# Patient Record
Sex: MALE | Race: WHITE | HISPANIC OR LATINO | Employment: UNEMPLOYED | URBAN - METROPOLITAN AREA
[De-identification: names, ages, dates, MRNs, and addresses within clinical notes are randomized per-mention and may not be internally consistent; named-entity substitution may affect disease eponyms.]

---

## 2019-04-16 ENCOUNTER — TELEPHONE (OUTPATIENT)
Dept: NEUROLOGY | Facility: CLINIC | Age: 39
End: 2019-04-16

## 2019-05-23 ENCOUNTER — OFFICE VISIT (OUTPATIENT)
Dept: NEUROLOGY | Facility: CLINIC | Age: 39
End: 2019-05-23
Payer: COMMERCIAL

## 2019-05-23 ENCOUNTER — TELEPHONE (OUTPATIENT)
Dept: NEUROLOGY | Facility: CLINIC | Age: 39
End: 2019-05-23

## 2019-05-23 VITALS
BODY MASS INDEX: 28.98 KG/M2 | WEIGHT: 191.2 LBS | DIASTOLIC BLOOD PRESSURE: 82 MMHG | HEART RATE: 106 BPM | HEIGHT: 68 IN | RESPIRATION RATE: 18 BRPM | SYSTOLIC BLOOD PRESSURE: 130 MMHG

## 2019-05-23 DIAGNOSIS — F31.4 BIPOLAR 1 DISORDER, DEPRESSED, SEVERE (HCC): ICD-10-CM

## 2019-05-23 DIAGNOSIS — R41.3 AMNESIA/MEMORY DISORDER: Primary | ICD-10-CM

## 2019-05-23 PROBLEM — F29 PSYCHOSIS (HCC): Status: ACTIVE | Noted: 2019-03-08

## 2019-05-23 PROBLEM — F43.10 PTSD (POST-TRAUMATIC STRESS DISORDER): Status: ACTIVE | Noted: 2018-01-05

## 2019-05-23 PROBLEM — B20: Status: ACTIVE | Noted: 2017-01-06

## 2019-05-23 PROBLEM — G47.9 TROUBLE IN SLEEPING: Status: ACTIVE | Noted: 2019-03-09

## 2019-05-23 PROBLEM — G56.03 BILATERAL CARPAL TUNNEL SYNDROME: Status: ACTIVE | Noted: 2017-02-24

## 2019-05-23 PROBLEM — A52.8 LATE LATENT SYPHILIS: Status: ACTIVE | Noted: 2019-05-23

## 2019-05-23 PROBLEM — Z21: Status: ACTIVE | Noted: 2017-01-06

## 2019-05-23 PROBLEM — M54.50 CHRONIC BILATERAL LOW BACK PAIN WITHOUT SCIATICA: Status: ACTIVE | Noted: 2018-09-17

## 2019-05-23 PROBLEM — K29.50 CHRONIC GASTRITIS WITHOUT BLEEDING: Status: ACTIVE | Noted: 2017-02-15

## 2019-05-23 PROBLEM — E78.1 HYPERTRIGLYCERIDEMIA: Status: ACTIVE | Noted: 2017-02-15

## 2019-05-23 PROBLEM — G89.29 CHRONIC BILATERAL LOW BACK PAIN WITHOUT SCIATICA: Status: ACTIVE | Noted: 2018-09-17

## 2019-05-23 PROCEDURE — 99243 OFF/OP CNSLTJ NEW/EST LOW 30: CPT | Performed by: PSYCHIATRY & NEUROLOGY

## 2019-05-23 RX ORDER — TRIAMCINOLONE ACETONIDE 55 UG/1
SPRAY, METERED NASAL
COMMUNITY
Start: 2019-04-22

## 2019-05-23 RX ORDER — CLONAZEPAM 0.5 MG/1
TABLET ORAL
COMMUNITY
Start: 2019-05-17

## 2019-05-23 RX ORDER — FAMOTIDINE 20 MG/1
TABLET, FILM COATED ORAL
COMMUNITY
Start: 2019-04-23

## 2019-05-23 RX ORDER — ABACAVIR SULFATE, DOLUTEGRAVIR SODIUM, LAMIVUDINE 600; 50; 300 MG/1; MG/1; MG/1
TABLET, FILM COATED ORAL
COMMUNITY
Start: 2019-05-06

## 2019-05-23 RX ORDER — QUETIAPINE FUMARATE 100 MG/1
TABLET, FILM COATED ORAL
COMMUNITY
Start: 2019-05-08

## 2019-05-23 RX ORDER — ALBUTEROL SULFATE 90 UG/1
AEROSOL, METERED RESPIRATORY (INHALATION)
COMMUNITY
Start: 2019-04-08 | End: 2019-05-23 | Stop reason: SDUPTHER

## 2019-05-23 RX ORDER — MONTELUKAST SODIUM 10 MG/1
10 TABLET ORAL
COMMUNITY
Start: 2019-04-08 | End: 2020-04-07

## 2019-05-23 RX ORDER — TRAMADOL HYDROCHLORIDE 50 MG/1
TABLET ORAL
COMMUNITY
Start: 2019-03-08

## 2019-05-23 RX ORDER — CLINDAMYCIN PHOSPHATE 10 UG/ML
LOTION TOPICAL
COMMUNITY
Start: 2019-05-07

## 2019-05-23 RX ORDER — ADAPALENE 45 G/G
GEL TOPICAL
COMMUNITY
Start: 2019-05-08

## 2019-05-23 RX ORDER — ACETAMINOPHEN 160 MG
TABLET,DISINTEGRATING ORAL
COMMUNITY
Start: 2019-05-22

## 2019-05-23 RX ORDER — FLUTICASONE FUROATE AND VILANTEROL 100; 25 UG/1; UG/1
1 POWDER RESPIRATORY (INHALATION) DAILY
COMMUNITY
Start: 2018-10-04 | End: 2019-10-04

## 2019-05-23 RX ORDER — LURASIDONE HYDROCHLORIDE 80 MG/1
TABLET, FILM COATED ORAL
COMMUNITY
Start: 2019-03-22

## 2019-05-23 RX ORDER — ALBUTEROL SULFATE 90 UG/1
AEROSOL, METERED RESPIRATORY (INHALATION)
COMMUNITY
Start: 2019-05-06

## 2019-05-23 RX ORDER — ACETAMINOPHEN 160 MG
TABLET,DISINTEGRATING ORAL
COMMUNITY
Start: 2019-04-23 | End: 2019-05-23 | Stop reason: SDUPTHER

## 2019-05-23 RX ORDER — CELECOXIB 50 MG/1
CAPSULE ORAL
COMMUNITY
Start: 2019-04-23

## 2019-05-23 RX ORDER — CETIRIZINE HYDROCHLORIDE 10 MG/1
10 TABLET, CHEWABLE ORAL DAILY
COMMUNITY
Start: 2019-04-08

## 2019-05-23 RX ORDER — GUAIFENESIN, PSEUDOEPHEDRINE HYDROCHLORIDE 600; 60 MG/1; MG/1
1 TABLET, EXTENDED RELEASE ORAL EVERY 12 HOURS
COMMUNITY
Start: 2019-04-11 | End: 2020-04-10

## 2019-05-23 RX ORDER — CLONAZEPAM 1 MG/1
TABLET ORAL
COMMUNITY
Start: 2019-03-19

## 2019-05-23 RX ORDER — QUETIAPINE FUMARATE 300 MG/1
300 TABLET, FILM COATED ORAL
COMMUNITY
Start: 2019-03-14

## 2019-05-23 RX ORDER — DEXTRAN 70 AND HYPROMELLOSE 2910 1; 3 MG/ML; MG/ML
SOLUTION/ DROPS OPHTHALMIC
COMMUNITY
Start: 2019-05-22

## 2019-05-23 RX ORDER — MULTIVITAMIN,THERAPEUTIC
TABLET ORAL
COMMUNITY
Start: 2019-05-06

## 2019-05-23 RX ORDER — PANTOPRAZOLE SODIUM 40 MG/1
TABLET, DELAYED RELEASE ORAL
COMMUNITY
Start: 2019-04-23

## 2019-05-23 RX ORDER — GABAPENTIN 800 MG/1
800 TABLET ORAL 3 TIMES DAILY
COMMUNITY
Start: 2019-05-22

## 2019-05-23 RX ORDER — CHLORHEXIDINE GLUCONATE 4 G/100ML
SOLUTION TOPICAL
COMMUNITY
Start: 2019-04-23

## 2019-05-23 RX ORDER — SUCRALFATE 1 G/1
TABLET ORAL
COMMUNITY
Start: 2019-05-22

## 2019-05-23 RX ORDER — TRIAMCINOLONE ACETONIDE 55 UG/1
SPRAY, METERED NASAL
COMMUNITY
Start: 2019-05-22

## 2019-05-23 RX ORDER — QUETIAPINE FUMARATE 400 MG/1
TABLET, FILM COATED ORAL
COMMUNITY
Start: 2019-05-08

## 2019-05-23 RX ORDER — BUPROPION HYDROCHLORIDE 150 MG/1
150 TABLET ORAL DAILY
COMMUNITY
Start: 2019-03-15

## 2019-05-24 ENCOUNTER — TRANSCRIBE ORDERS (OUTPATIENT)
Dept: NEUROLOGY | Facility: CLINIC | Age: 39
End: 2019-05-24

## 2019-05-24 DIAGNOSIS — R41.3 AMNESIA/MEMORY DISORDER: Primary | ICD-10-CM

## 2019-06-05 ENCOUNTER — TELEPHONE (OUTPATIENT)
Dept: NEUROLOGY | Facility: CLINIC | Age: 39
End: 2019-06-05

## 2019-06-13 ENCOUNTER — HOSPITAL ENCOUNTER (OUTPATIENT)
Dept: NEUROLOGY | Facility: HOSPITAL | Age: 39
Discharge: HOME | End: 2019-06-13
Payer: COMMERCIAL

## 2019-06-13 DIAGNOSIS — R41.3 AMNESIA/MEMORY DISORDER: ICD-10-CM

## 2019-06-13 PROCEDURE — 95816 EEG AWAKE AND DROWSY: CPT

## 2019-06-14 PROCEDURE — 95819 EEG AWAKE AND ASLEEP: CPT | Performed by: PSYCHIATRY & NEUROLOGY

## 2019-06-18 ENCOUNTER — TELEPHONE (OUTPATIENT)
Dept: NEUROLOGY | Facility: CLINIC | Age: 39
End: 2019-06-18

## 2019-06-19 ENCOUNTER — TELEPHONE (OUTPATIENT)
Dept: NEUROLOGY | Facility: CLINIC | Age: 39
End: 2019-06-19

## 2019-06-24 ENCOUNTER — TELEPHONE (OUTPATIENT)
Dept: NEUROLOGY | Facility: CLINIC | Age: 39
End: 2019-06-24

## 2019-06-27 ENCOUNTER — TELEPHONE (OUTPATIENT)
Dept: NEUROLOGY | Facility: CLINIC | Age: 39
End: 2019-06-27

## 2019-07-08 ENCOUNTER — OFFICE VISIT (OUTPATIENT)
Dept: NEUROLOGY | Facility: CLINIC | Age: 39
End: 2019-07-08
Payer: COMMERCIAL

## 2019-07-08 ENCOUNTER — PATIENT OUTREACH (OUTPATIENT)
Dept: NEUROLOGY | Facility: CLINIC | Age: 39
End: 2019-07-08

## 2019-07-08 VITALS
RESPIRATION RATE: 18 BRPM | SYSTOLIC BLOOD PRESSURE: 130 MMHG | HEART RATE: 74 BPM | DIASTOLIC BLOOD PRESSURE: 78 MMHG | WEIGHT: 187 LBS | BODY MASS INDEX: 28.34 KG/M2 | HEIGHT: 68 IN

## 2019-07-08 DIAGNOSIS — R41.3 AMNESIA/MEMORY DISORDER: Primary | ICD-10-CM

## 2019-07-08 DIAGNOSIS — F31.4 BIPOLAR 1 DISORDER, DEPRESSED, SEVERE (HCC): ICD-10-CM

## 2019-07-08 PROCEDURE — 99213 OFFICE O/P EST LOW 20 MIN: CPT | Performed by: PSYCHIATRY & NEUROLOGY

## 2019-07-08 NOTE — PROGRESS NOTES
Informed patient of appt with Psychiatrist on 7/31/19 patient wants a  Sooner appt   Aurelio recommended patient to discuss with therapist his ext [phone] is  on 7/10/19  SW was told that patient could talk to counselor as he has access to check the cancellation list

## 2019-07-08 NOTE — PROGRESS NOTES
MSW called MARISSA patient has appt with Psychiatrist on 7/31/19 at 10:45am     Patient has appt with therapist on 7/10/19 and 7/17/19

## 2019-07-08 NOTE — ASSESSMENT & PLAN NOTE
Again, feel his issues with memory are more on a retrieval than a memory formation basis  Although he does present a family history of dementing illness, my sense at this point in time, at least with him, is that the his issues with memory are a reflection of his ongoing severe depression  Unfortunately, despite increases in his medication by Psychiatry, he does not feel that his depression has in any way improved  --will continue to work with Psychiatry in hopes of improving his depressive state and with it his symptomatic memory complaints  --will complete his blood work survey with Óscar singleton which he did not have done following his last appointment  --has asked that he be followed by the same individual who is following his mother apparently for memory issues and, as result, will be scheduled for continued care with Dr Luz Maria Graham with his appointment set October 14th

## 2019-07-08 NOTE — PROGRESS NOTES
Patient reports therapist Cam Masterson can be reached at ext  [phone] does not have follow up appointment with psych yet   SW to follow up

## 2019-07-08 NOTE — PROGRESS NOTES
As per Dr Pee Seymour patient is followed at [facility] however his psych there is out on medical leave so he is having a hard time getting another follow up appointment scheduled there  Dr Marie Head is asking if SW could help patient with rescheduling psych appt at [facility] [place]

## 2019-07-08 NOTE — LETTER
July 8, 2019     Austin Johnson, [address] [place] [id number]    Patient: Jose Alejandro Rivero   YOB: 1980   Date of Visit: 7/8/2019       Dear Dr Dona Hall: Thank you for referring Jose Alejandro Rivero to me for evaluation  Below are my notes for this consultation  If you have questions, please do not hesitate to call me  I look forward to following your patient along with you  Sincerely,        Josie Hunter MD        CC: No Recipients  Josie Hunter MD  7/8/2019  1:27 PM  Sign at close encounter  Patient ID: Jose Alejandro Rivero is a 44 y o  male  Assessment/Plan:    Disturbance of memory       Bipolar 1 disorder, depressed, severe ([address] )  Again, feel his issues with memory are more on a retrieval than a memory formation basis  Although he does present a family history of dementing illness, my sense at this point in time, at least with him, is that the his issues with memory are a reflection of his ongoing severe depression  Unfortunately, despite increases in his medication by Psychiatry, he does not feel that his depression has in any way improved  --will continue to work with Psychiatry in hopes of improving his depressive state and with it his symptomatic memory complaints  --will complete his blood work survey with Óscar singleton which he did not have done following his last appointment  --has asked that he be followed by the same individual who is following his mother apparently for memory issues and, as result, will be scheduled for continued care with Dr Alvin Valentin with his appointment set October 14th  He will follow up as noted above  Subjective:    HPI  The patient, 44years of age, returns to reassess the status of his memory complaints in the presence of what appears to be a significant element of ongoing depression  As result of language issues, phone  #[phone] was utilized      He does not feel that his memory has in any way improved since last seen and continues to describe more what would appear to be a problem with retrieval than with memory formation  His issues are complicated by his historical HIV, his diagnosed bipolar 1 and with the family history of apparent dementia  However, after his initial evaluation by Neurology, his issues were felt very likely to have a significant contribution due to his ongoing profound depression  Previously with unremarkable B12 and folate levels, normal TSH and a nonreactive RPR  Was to have a Lyme serology performed but, unfortunately, did not do so  Did have his EEG study performed that was a normal awake, drowsing and sleep study  The MRI brain that was suggested was declined by his insurance  Past Medical History:   Diagnosis Date    Bilateral carpal tunnel syndrome 2/24/2017    Chronic gastritis without bleeding 2/15/2017    Neg CT Scan A+P and EGD in 3 2017 Neg colonoscopy 6 2017 Neg Gastric Emptying study in 4 2017  Last Assessment & Plan:  Better    HIV [facility]human immunodeficiency virus infection) ([facility] )     Human immunodeficiency virus I infection ([place] [address] ) 1/6/2017    HIV (+) 2012, CD4 was 28 then, MSM transmission in [place] CD4 288 (11/2016)  HLA  neg 1 2017  cART 1- Started in 5 2012 w/ Atripla, 2- Changed to Stribild in 5 2016 due to elevated TG 3- Change to Verónica Oviedo in Summer 2017 to decrease TDF long term AE 4- Change to Triumeq in 4 2018 to decrease TG (still high) and avoid Carlita for long term AE  Last Assessment & Plan:  He was well controlled on his med    Late latent syphilis 5/23/2019    Psychosis ([facility] ) 3/8/2019    PTSD (post-traumatic stress disorder) 1/5/2018    Severe recurrent major depressive disorder with psychotic symptoms ([facility] ) 11/2/2016    Last Assessment & Plan:  Restarting medications listed on his discharge paperwork from [address] [place] [address] Road 150 mg daily, Risperdal 0 5 mg BID and Neurontin 300 mg TID    Advised patient that I will be stopping Klonopin and Ambien given his ongoing marijuana use  Reducing Ambien to 5 mg QHS with plan to discontinue at next appointment and tapering off Klonopin over the next month  He pick    Trouble in sleeping 3/9/2019     Past Surgical History:   Procedure Laterality Date    GROIN EXPLORATION      MANDIBLE SURGERY      NOSE SURGERY       Social History     Socioeconomic History    Marital status: Unknown     Spouse name: None    Number of children: None    Years of education: None    Highest education level: None   Occupational History    None   Social Needs    Financial resource strain: None    Food insecurity:     Worry: None     Inability: None    Transportation needs:     Medical: None     Non-medical: None   Tobacco Use    Smoking status: Never Smoker    Smokeless tobacco: Never Used   Substance and Sexual Activity    Alcohol use: Never     Frequency: Never    Drug use: None    Sexual activity: None   Lifestyle    Physical activity:     Days per week: None     Minutes per session: None    Stress: None   Relationships    Social connections:     Talks on phone: None     Gets together: None     Attends [employer] service: None     Active member of club or organization: None     Attends meetings of clubs or organizations: None     Relationship status: None    Intimate partner violence:     Fear of current or ex partner: None     Emotionally abused: None     Physically abused: None     Forced sexual activity: None   Other Topics Concern    None   Social History Narrative    None     Family History   Problem Relation Age of Onset    Depression Mother     Anxiety disorder Mother     Schizoaffective Disorder  Mother     Depression Father     Colon cancer Father     Depression Maternal Grandmother     Dementia Maternal Grandmother      Allergies   Allergen Reactions    Shellfish Allergy Anaphylaxis     Patient states allergic to shark meat       Shark Cartilage    [employer] Current Outpatient Medications:     abacavir-dolutegravir-lamiVUDine (TRIUMEQ) 600- mg per tablet, Take 1 tablet by mouth daily, Disp: , Rfl:     adapalene (DIFFERIN) 0 1 % gel, , Disp: , Rfl:     albuterol (PROVENTIL HFA,VENTOLIN HFA) 90 mcg/act inhaler, , Disp: , Rfl:     benzoyl peroxide 5 % external liquid, Apply topically 2 (two) times a day, Disp: , Rfl:     buPROPion (WELLBUTRIN XL) 150 mg 24 hr tablet, Take 150 mg by mouth daily, Disp: , Rfl:     celecoxib (CeleBREX) 50 MG capsule, TAKE 1 TO 2 CAPSULES BY MOUTH EVERY 12 HOURS WITH FOOD, Disp: , Rfl:     cetirizine (ZyrTEC) 10 MG chewable tablet, Chew 10 mg daily, Disp: , Rfl:     chlorhexidine (HIBICLENS) 4 % external liquid, CIPRIANO DIARIAMENTE CUANDO SEA NECESARIO PARA HERIDA ALEJANDRO FUE DIRIJIDO, Disp: , Rfl:     Cholecalciferol (VITAMIN D3) 2000 units capsule, , Disp: , Rfl:     clindamycin (CLEOCIN T) 1 % lotion, , Disp: , Rfl:     clonazePAM (KlonoPIN) 1 mg tablet, 1 to 2 tablets as needed, Disp: , Rfl:     dextran 70-hypromellose (GENTEAL TEARS) 0 1-0 3 % ophthalmic solution, PONER 2 GOTAS EN EL FELIX(S) 2 VECES AL LINDA, Disp: , Rfl:     elvitegravir-cobicistat-emtricitabine-tenofovir (STRIBILD) 497-019-349-300 mg, Take 1 tablet by mouth Daily, Disp: , Rfl:     famotidine (PEPCID) 20 mg tablet, TAKE ONE TABLET BY MOUTH 2 TIMES A DAY AS NEEDED FOR HEARTBURN, Disp: , Rfl:     fluticasone-vilanterol (BREO ELLIPTA) 100-25 mcg/inh inhaler, Inhale 1 puff daily, Disp: , Rfl:     gabapentin (NEURONTIN) 800 mg tablet, Take 800 mg by mouth 3 (three) times a day , Disp: , Rfl:     GENTEAL TEARS 0 1-0 3 % ophthalmic solution, , Disp: , Rfl:     montelukast (SINGULAIR) 10 mg tablet, Take 10 mg by mouth, Disp: , Rfl:     Multiple Vitamin (THERA-TABS) TABS, , Disp: , Rfl:     NASAL DECONGESTANT SPRAY 0 05 % nasal spray, , Disp: , Rfl:     pantoprazole (PROTONIX) 40 mg tablet, TAKE 1 TABLET BY MOUTH EVERY 12 HOURS, Disp: , Rfl:    QUEtiapine (SEROquel) 100 mg tablet, , Disp: , Rfl:     QUEtiapine (SEROquel) 400 MG tablet, , Disp: , Rfl:     sucralfate (CARAFATE) 1 g tablet, TAKE 1 TABLET BY MOUTH FOUR TIMES A DAY (BEFORE MEALS AND AT BEDTIME), Disp: , Rfl:     traMADol (ULTRAM) 50 mg tablet, Take one to two as needed for moderate pain  Do not exceed 2 tabs a day, Disp: , Rfl:     Triamcinolone Acetonide 55 MCG/ACT AERO, ECHARSE 1 CAROLINA EN CADA FOSA NASAL DIARIAMENTE, Disp: , Rfl:     Triamcinolone Acetonide 55 MCG/ACT AERO, , Disp: , Rfl:     TRIUMEQ 600- MG per tablet, , Disp: , Rfl:     clonazePAM (KlonoPIN) 0 5 mg tablet, , Disp: , Rfl:     LATUDA 80 MG tablet, , Disp: , Rfl:     pseudoephedrine-guaifenesin (MUCINEX D)  MG per tablet, Take 1 tablet by mouth every 12 (twelve) hours, Disp: , Rfl:     QUEtiapine (SEROquel) 300 mg tablet, Take 300 mg by mouth, Disp: , Rfl:     Objective:    Blood pressure 130/78, pulse 74, resp  rate 18, height 5' 8" (1 727 m), weight 84 8 kg (187 lb)  Physical Exam  Head normocephalic  Eyes nonicteric  No audible anterior neck bruits  Lungs clear to auscultation  Rhythm regular  GI (abdomen) soft nontender with bowel sounds present  No significant lower extremity edema  Neurological Exam  Alert  Pleasantly interactive  Answered correctly when asked name and birth date  Correctly state the year, the month and the date date  Two of 3 simple object recall  No difficulties with naming, repetition or accurately following a 3 step request   Bedside neuro cognitive testing not administered today as was just done on his last appointment  In review:  --27/30 on MMSE   --30/30 on a depression scale which supported in ongoing profound depression  Cranial nerves 2-12 tested and grossly intact except for a modest right facial asymmetry, unchanged  Accurate finger-to-nose bilaterally  Full symmetrical strength throughout the 4 extremities with no upper extremity drift    Diffusely and symmetrically hyporeflexic  ROS:    Review of Systems   Constitutional: Positive for appetite change and fatigue  Negative for fever  HENT: Negative  Negative for hearing loss, tinnitus, trouble swallowing and voice change  Eyes: Negative  Negative for photophobia and pain  Respiratory: Negative  Negative for shortness of breath  Cardiovascular: Negative  Negative for palpitations  Gastrointestinal: Negative  Negative for nausea and vomiting  Endocrine: Negative  Negative for cold intolerance and heat intolerance  Genitourinary: Negative  Negative for dysuria, frequency and urgency  Musculoskeletal: Negative  Negative for myalgias and neck pain  Skin: Negative  Negative for rash  Allergic/Immunologic: Negative  Neurological: Negative  Negative for dizziness, tremors, seizures, syncope, facial asymmetry, speech difficulty, weakness, light-headedness, numbness and headaches  Hematological: Negative  Does not bruise/bleed easily  Psychiatric/Behavioral: Positive for confusion  Negative for hallucinations and sleep disturbance  The patient is nervous/anxious  I personally reviewed the ROS that was entered by the medical assistant  *Please note this document was created using voice recognition software and may contain sound-alike word errors  *

## 2019-07-08 NOTE — PROGRESS NOTES
Patient ID: Rakel Garcia is a 44 y o  male  Assessment/Plan:    Disturbance of memory       Bipolar 1 disorder, depressed, severe ([address] )  Again, feel his issues with memory are more on a retrieval than a memory formation basis  Although he does present a family history of dementing illness, my sense at this point in time, at least with him, is that the his issues with memory are a reflection of his ongoing severe depression  Unfortunately, despite increases in his medication by Psychiatry, he does not feel that his depression has in any way improved  --will continue to work with Psychiatry in hopes of improving his depressive state and with it his symptomatic memory complaints  --will complete his blood work survey with Lyme serolo which he did not have done following his last appointment  --has asked that he be followed by the same individual who is following his mother apparently for memory issues and, as result, will be scheduled for continued care with Dr Mulugeta Junior with his appointment set October 14th  He will follow up as noted above  Subjective:    HPI  The patient, 44years of age, returns to reassess the status of his memory complaints in the presence of what appears to be a significant element of ongoing depression  As result of language issues, phone  #[phone] was utilized  He does not feel that his memory has in any way improved since last seen and continues to describe more what would appear to be a problem with retrieval than with memory formation  His issues are complicated by his historical HIV, his diagnosed bipolar 1 and with the family history of apparent dementia  However, after his initial evaluation by Neurology, his issues were felt very likely to have a significant contribution due to his ongoing profound depression  Previously with unremarkable B12 and folate levels, normal TSH and a nonreactive RPR    Was to have a Lyme serology performed but, unfortunately, did not do so  Did have his EEG study performed that was a normal awake, drowsing and sleep study  The MRI brain that was suggested was declined by his insurance  Past Medical History:   Diagnosis Date    Bilateral carpal tunnel syndrome 2/24/2017    Chronic gastritis without bleeding 2/15/2017    Neg CT Scan A+P and EGD in 3 2017 Neg colonoscopy 6 2017 Neg Gastric Emptying study in 4 2017  Last Assessment & Plan:  Better    HIV [facility] immunodeficiency virus infection) ([email or web address] )     Human immunodeficiency virus I infection ([place] [address] ) 1/6/2017    HIV (+) 2012, CD4 was 28 then, MSM transmission in [place] CD4 288 (11/2016)  HLA  neg 1 2017  cART 1- Started in 5 2012 w/ Atripla, 2- Changed to Stribild in 5 2016 due to elevated TG 3- Change to Josephus Caulk in Summer 2017 to decrease TDF long term AE 4- Change to Triumeq in 4 2018 to decrease TG (still high) and avoid Carlita for long term AE  Last Assessment & Plan:  He was well controlled on his med    Late latent syphilis 5/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    Psychosis ([email or web address] ) 3/8/2019    PTSD (post-traumatic stress disorder) 1/5/2018    Severe recurrent major depressive disorder with psychotic symptoms ([email or web address] ) 11/2/2016    Last Assessment & Plan:  Restarting medications listed on his discharge paperwork from [address] [place] [address] 150 mg daily, Risperdal 0 5 mg BID and Neurontin 300 mg TID  Advised patient that I will be stopping Klonopin and Ambien given his ongoing marijuana use  Reducing Ambien to 5 mg QHS with plan to discontinue at next appointment and tapering off Klonopin over the next month    He pick    Trouble in sleeping 3/9/2019     Past Surgical History:   Procedure Laterality Date    GROIN EXPLORATION      MANDIBLE SURGERY      NOSE SURGERY       Social History     Socioeconomic History    Marital status: Unknown     Spouse name: None    Number of children: None    Years of education: None    Highest education level: None   Occupational History    None Social Needs    Financial resource strain: None    Food insecurity:     Worry: None     Inability: None    Transportation needs:     Medical: None     Non-medical: None   Tobacco Use    Smoking status: Never Smoker    Smokeless tobacco: Never Used   Substance and Sexual Activity    Alcohol use: Never     Frequency: Never    Drug use: None    Sexual activity: None   Lifestyle    Physical activity:     Days per week: None     Minutes per session: None    Stress: None   Relationships    Social connections:     Talks on phone: None     Gets together: None     Attends [employer] service: None     Active member of club or organization: None     Attends meetings of clubs or organizations: None     Relationship status: None    Intimate partner violence:     Fear of current or ex partner: None     Emotionally abused: None     Physically abused: None     Forced sexual activity: None   Other Topics Concern    None   Social History Narrative    None     Family History   Problem Relation Age of Onset    Depression Mother     Anxiety disorder Mother     Schizoaffective Disorder  Mother     Depression Father     Colon cancer Father     Depression Maternal Grandmother     Dementia Maternal Grandmother      Allergies   Allergen Reactions    Shellfish Allergy Anaphylaxis     Patient states allergic to shark meat       Shark Cartilage     Shellfish-Derived Products        Current Outpatient Medications:     abacavir-dolutegravir-lamiVUDine (TRIUMEQ) 600- mg per tablet, Take 1 tablet by mouth daily, Disp: , Rfl:     adapalene (DIFFERIN) 0 1 % gel, , Disp: , Rfl:     albuterol (PROVENTIL HFA,VENTOLIN HFA) 90 mcg/act inhaler, , Disp: , Rfl:     benzoyl peroxide 5 % external liquid, Apply topically 2 (two) times a day, Disp: , Rfl:     buPROPion (WELLBUTRIN XL) 150 mg 24 hr tablet, Take 150 mg by mouth daily, Disp: , Rfl:     celecoxib (CeleBREX) 50 MG capsule, TAKE 1 TO 2 CAPSULES BY MOUTH EVERY 12 HOURS WITH FOOD, Disp: , Rfl:     cetirizine (ZyrTEC) 10 MG chewable tablet, Chew 10 mg daily, Disp: , Rfl:     chlorhexidine (HIBICLENS) 4 % external liquid, CIPRIANO DIARIAMENTE CUANDO SEA NECESARIO PARA HERIDA ALEJANDRO FUE DIRIJIDO, Disp: , Rfl:     Cholecalciferol (VITAMIN D3) 2000 units capsule, , Disp: , Rfl:     clindamycin (CLEOCIN T) 1 % lotion, , Disp: , Rfl:     clonazePAM (KlonoPIN) 1 mg tablet, 1 to 2 tablets as needed, Disp: , Rfl:     dextran 70-hypromellose (GENTEAL TEARS) 0 1-0 3 % ophthalmic solution, PONER 2 GOTAS EN EL FELIX(S) 2 VECES AL LINDA, Disp: , Rfl:     elvitegravir-cobicistat-emtricitabine-tenofovir (STRIBILD) [id number] mg, Take 1 tablet by mouth Daily, Disp: , Rfl:     famotidine (PEPCID) 20 mg tablet, TAKE ONE TABLET BY MOUTH 2 TIMES A DAY AS NEEDED FOR HEARTBURN, Disp: , Rfl:     fluticasone-vilanterol (BREO ELLIPTA) 100-25 mcg/inh inhaler, Inhale 1 puff daily, Disp: , Rfl:     gabapentin (NEURONTIN) 800 mg tablet, Take 800 mg by mouth 3 (three) times a day , Disp: , Rfl:     GENTEAL TEARS 0 1-0 3 % ophthalmic solution, , Disp: , Rfl:     montelukast (SINGULAIR) 10 mg tablet, Take 10 mg by mouth, Disp: , Rfl:     Multiple Vitamin (THERA-TABS) TABS, , Disp: , Rfl:     NASAL DECONGESTANT SPRAY 0 05 % nasal spray, , Disp: , Rfl:     pantoprazole (PROTONIX) 40 mg tablet, TAKE 1 TABLET BY MOUTH EVERY 12 HOURS, Disp: , Rfl:     QUEtiapine (SEROquel) 100 mg tablet, , Disp: , Rfl:     QUEtiapine (SEROquel) 400 MG tablet, , Disp: , Rfl:     sucralfate (CARAFATE) 1 g tablet, TAKE 1 TABLET BY MOUTH FOUR TIMES A DAY (BEFORE MEALS AND AT BEDTIME), Disp: , Rfl:     traMADol (ULTRAM) 50 mg tablet, Take one to two as needed for moderate pain   Do not exceed 2 tabs a day, Disp: , Rfl:     Triamcinolone Acetonide 55 MCG/ACT AERO, ECHARSE 1 CAROLINA EN CADA FOSA NASAL DIARIAMENTE, Disp: , Rfl:     Triamcinolone Acetonide 55 MCG/ACT AERO, , Disp: , Rfl:     TRIUMEQ 600- MG per tablet, , Disp: , Rfl:     clonazePAM (KlonoPIN) 0 5 mg tablet, , Disp: , Rfl:     LATUDA 80 MG tablet, , Disp: , Rfl:     pseudoephedrine-guaifenesin (MUCINEX D)  MG per tablet, Take 1 tablet by mouth every 12 (twelve) hours, Disp: , Rfl:     QUEtiapine (SEROquel) 300 mg tablet, Take 300 mg by mouth, Disp: , Rfl:     Objective:    Blood pressure 130/78, pulse 74, resp  rate 18, height 5' 8" (1 727 m), weight 84 8 kg (187 lb)  Physical Exam  Head normocephalic  Eyes nonicteric  No audible anterior neck bruits  Lungs clear to auscultation  Rhythm regular  GI (abdomen) soft nontender with bowel sounds present  No significant lower extremity edema  Neurological Exam  Alert  Pleasantly interactive  Answered correctly when asked name and birth date  Correctly state the year, the month and the date date  Two of 3 simple object recall  No difficulties with naming, repetition or accurately following a 3 step request   Bedside neuro cognitive testing not administered today as was just done on his last appointment  In review:  --27/30 on MMSE   --30/30 on a depression scale which supported in ongoing profound depression  Cranial nerves 2-12 tested and grossly intact except for a modest right facial asymmetry, unchanged  Accurate finger-to-nose bilaterally  Full symmetrical strength throughout the 4 extremities with no upper extremity drift  Diffusely and symmetrically hyporeflexic  ROS:    Review of Systems   Constitutional: Positive for appetite change and fatigue  Negative for fever  HENT: Negative  Negative for hearing loss, tinnitus, trouble swallowing and voice change  Eyes: Negative  Negative for photophobia and pain  Respiratory: Negative  Negative for shortness of breath  Cardiovascular: Negative  Negative for palpitations  Gastrointestinal: Negative  Negative for nausea and vomiting  Endocrine: Negative  Negative for cold intolerance and heat intolerance  Genitourinary: Negative  Negative for dysuria, frequency and urgency  Musculoskeletal: Negative  Negative for myalgias and neck pain  Skin: Negative  Negative for rash  Allergic/Immunologic: Negative  Neurological: Negative  Negative for dizziness, tremors, seizures, syncope, facial asymmetry, speech difficulty, weakness, light-headedness, numbness and headaches  Hematological: Negative  Does not bruise/bleed easily  Psychiatric/Behavioral: Positive for confusion  Negative for hallucinations and sleep disturbance  The patient is nervous/anxious  I personally reviewed the ROS that was entered by the medical assistant  *Please note this document was created using voice recognition software and may contain sound-alike word errors  *

## 2019-07-29 ENCOUNTER — TELEPHONE (OUTPATIENT)
Dept: NEUROLOGY | Facility: CLINIC | Age: 39
End: 2019-07-29

## 2019-07-29 NOTE — TELEPHONE ENCOUNTER
Dr Briant Riedel,    Patient is requesting a letter from Neurology that includes the dx given to him in neurology and a statement explaining that his dx affects his daily living activities  He is trying to obtain SSI benefits  The pt wants to provide the letter to his  as he believes the letter will help with his SSI case  Do you think that based on his dx the patient is unable to work or is limited to complete his daily living activities?      Please advise

## 2019-07-29 NOTE — TELEPHONE ENCOUNTER
This patient is no longer going to follow here  He wishes to follow with the same neurologist who takes care of his mother and that is Dr Wendy Davis  My feeling on single evaluation was that his memory difficulties were routed in severe depression and that he should continue his work with Psychiatry  If he needs a letter that his depression is impacting on his life any need to get a from a psychiatrist   He can always have a copy of our office letter as well  And if he needs anything additional from Neurology he will have to wait until after he is seen in further evaluated by Dr Wendy Davis

## 2019-07-29 NOTE — TELEPHONE ENCOUNTER
Patient contacted  to request his medical records as he is trying to appeal to obtain SSI  Informed patient that records are to be released by the [facility] office, information provided  Patient also requested the contact information for the [facility] neuro office [phone] [address]  Patient will provide information to the  that is working in his case            [facility] Medical Records  [address], [address] [place] [address] (Monday through Friday between 8 am and 6 pm)

## 2019-07-29 NOTE — LETTER
July 29, 2019     No Recipients    Patient: Karen Haque   YOB: 1980   Date of Visit: 7/29/2019                                       Sincerely,

## 2019-07-29 NOTE — LETTER
2019       To Whom it May Concern,      Olivia Townsend leelee 1980 was evaluated in our office for disturbance of memory  After evaluating the patient I concluded that his issues with memory are very likely a reflection of his ongoing severe depression  Chester Keith will continue to work with Psychiatry in hopes of improving his depressive state and with it his symptomatic memory complaints  Chester Keith will further be evaluated by Dr Wendy Davis in our Neurology office            Sincerely,          Dr Zachariah Antonio

## 2019-07-30 NOTE — TELEPHONE ENCOUNTER
Dr Sorin Rahman,    Thank you for signing the letter for the patient  While I was sharing with him what the letter states  Apparently, he wants two additional diagnosis on the letter  He shared that you gave him the dx of  'memory disorder' and 'amnesia'  I explained to the patient that I do not see those two dx on his medical records and for that reason we cannot add those dx on the letter  Please correct me if I am wrong  He reports that 'memory disorder' and 'amnesia' are in the paperwork that was given to him by the neuro office

## 2019-07-30 NOTE — TELEPHONE ENCOUNTER
SW informed patient Discussion with Dr Gray Ovalle  Patient reported understanding   Requested letter to be sent to his /therapist at [facility] faxed# [phone]    Also to be sent via mail     Letter was sent via fax as patient provided verbal permission and via mail to patient's home address

## 2019-07-30 NOTE — TELEPHONE ENCOUNTER
The amnesia portion is just what [facility] uses for the diagnosis  He has a memory disturbance as outlined in the letter given to him

## 2019-07-30 NOTE — TELEPHONE ENCOUNTER
Patient called this morning asking for a letter stating his dx for his  at [address] [place] [address] to be faxed at [phone]     Kasandra Hamilton [phone]

## 2019-08-02 NOTE — TELEPHONE ENCOUNTER
MSW called patient who asked for the letter that Dr Fernanda Potter signed to be faxed to his  who is working on his SSI case at [id number] name is Tristen aHmilton       Verbal permission given from patient

## 2019-09-30 ENCOUNTER — TELEPHONE (OUTPATIENT)
Dept: NEUROLOGY | Facility: CLINIC | Age: 39
End: 2019-09-30

## 2019-09-30 NOTE — TELEPHONE ENCOUNTER
MSW received a callback from Marcia at [address]  MSW confirmed that patient has an upcoming appt at this office on 10/3/19  Leo stated that patient is enrolled in a program with [facility] that they will send him bus passes  Leo stated that she will be able to get patient a bus pass for his 10/3/19 appt even on such short notice  Leo stated that she will need notice of all of patient's future appts so that she can issue a bus pass for him  Leo stated that she can be contacted at [phone] and a verbal confirmation can be given  Leo stated that if she is out of the office there will be an out of office message on her voicemail, and that if that is the case, that she would prefer that his office fax a brief statement on office letterhead (I e  This patient has an appt with _____ on _____ at ____time)  Leo stated that this can be faxed to [phone], kristina Wang  MSW will be available to patient as needed

## 2019-09-30 NOTE — TELEPHONE ENCOUNTER
Patient calling back requesting a   Called patient with  #[id number]  Patient states he wants to take his mother's scheduled appt on 10/3 at [facility] and she will take his appt on 10/14 in [facility]  Made him aware this was already done  He is asking for proof of appointment to be faxed to his  Seble Ramirez at [phone] so that he can get a bus pass  See below message from NEISHA

## 2019-09-30 NOTE — TELEPHONE ENCOUNTER
MSW received call from this patient this date looking for the Yoruba-speaking   MSW advised that Inocencia Peñaloza is out on leave and will not be returning until December  Patient stated (in [place]) that he and his mother both have upcoming appts with Dr Margarita Louis  Patient stated that he is hoping to get the appts changed to be on the same date and is inquiring if that would be a possibility  MSW advised that patient would need to be transferred to the Call Center to see if the appts can be scheduled on the same day  Patient then stated that he needed our office to fax his  a form to confirm his upcoming appt be sent to his , Pamalee Boeck, at [phone]  Pt stated that if she receives the form, she will be able to send him a bus pass to get him to appt  Elaine German, MR, spoke with patient to confirm what his request was  Patient was not able to elaborate further, but did provide his 's contact phone number as [phone] to inquire more about this  At the end of the call, Elaine German offered to transfer patient's call to the Call Center to see about changing appt dates, but the patient declined because his phone was dying  Patient took the number for the Call Center and stated the would call there directly  MSW phoned [phone]  The number was to Pamalee Boeck, but there was no answer  MSW left message requesting callback  Awaiting same

## 2019-10-03 ENCOUNTER — TELEPHONE (OUTPATIENT)
Dept: NEUROLOGY | Facility: CLINIC | Age: 39
End: 2019-10-03

## 2019-10-03 ENCOUNTER — OFFICE VISIT (OUTPATIENT)
Dept: NEUROLOGY | Facility: CLINIC | Age: 39
End: 2019-10-03
Payer: COMMERCIAL

## 2019-10-03 VITALS
HEIGHT: 68 IN | RESPIRATION RATE: 16 BRPM | SYSTOLIC BLOOD PRESSURE: 118 MMHG | BODY MASS INDEX: 27.48 KG/M2 | DIASTOLIC BLOOD PRESSURE: 70 MMHG | WEIGHT: 181.3 LBS | HEART RATE: 70 BPM

## 2019-10-03 DIAGNOSIS — F31.4 BIPOLAR 1 DISORDER, DEPRESSED, SEVERE (HCC): ICD-10-CM

## 2019-10-03 DIAGNOSIS — B20 HUMAN IMMUNODEFICIENCY VIRUS I INFECTION (HCC): ICD-10-CM

## 2019-10-03 DIAGNOSIS — F43.10 PTSD (POST-TRAUMATIC STRESS DISORDER): ICD-10-CM

## 2019-10-03 DIAGNOSIS — R41.3 AMNESIA/MEMORY DISORDER: ICD-10-CM

## 2019-10-03 DIAGNOSIS — F33.3 SEVERE RECURRENT MAJOR DEPRESSIVE DISORDER WITH PSYCHOTIC SYMPTOMS (HCC): Primary | ICD-10-CM

## 2019-10-03 PROCEDURE — 99215 OFFICE O/P EST HI 40 MIN: CPT | Performed by: PSYCHIATRY & NEUROLOGY

## 2019-10-03 RX ORDER — DOCUSATE SODIUM 100 MG/1
100 CAPSULE, LIQUID FILLED ORAL 2 TIMES DAILY PRN
COMMUNITY
Start: 2019-09-18 | End: 2020-09-17

## 2019-10-03 RX ORDER — CETIRIZINE HYDROCHLORIDE 10 MG/1
10 TABLET, CHEWABLE ORAL DAILY
COMMUNITY
Start: 2019-09-18

## 2019-10-03 RX ORDER — TRAZODONE HYDROCHLORIDE 100 MG/1
100 TABLET ORAL
COMMUNITY
Start: 2019-09-06 | End: 2019-10-06

## 2019-10-03 RX ORDER — SAW/PYGEUM/BETA/HERB/D3/B6/ZN 30 MG-25MG
10 CAPSULE ORAL
COMMUNITY
Start: 2019-09-18 | End: 2020-09-17

## 2019-10-03 RX ORDER — FLUTICASONE PROPIONATE 50 MCG
SPRAY, SUSPENSION (ML) NASAL
COMMUNITY
Start: 2019-09-03

## 2019-10-03 RX ORDER — ACETAMINOPHEN 160 MG
TABLET,DISINTEGRATING ORAL
COMMUNITY
Start: 2019-08-12

## 2019-10-03 RX ORDER — TADALAFIL 20 MG/1
20 TABLET ORAL
COMMUNITY
Start: 2019-09-18 | End: 2020-09-17

## 2019-10-03 NOTE — PATIENT INSTRUCTIONS
Please talk to your psychiatrist about arranging formal thinking and memory testing in [place] called "Neuropsych testing"  Remember to exercise (at least 30 min of aerobic/cardio exercise 3 days a week), eat a healthy diet (eg Mediterranean or MIND diet), remain mentally active and socially engaged  Good quality sleep is also very important for cognition

## 2019-10-03 NOTE — TELEPHONE ENCOUNTER
Patient called and requested I call him back with Armenian interpretor  Called patient back and [facility] for patient to return our call using a Armenian interpretor ([phone])

## 2019-10-03 NOTE — PROGRESS NOTES
Assessment/Plan:    Disturbance of memory  44year old man with significant psychiatric disease and HIV presents for evaluation of memory complaints  I agree with prior assessments that the bulk of his cognitive dysfunction is related to his psychiatric disease and polypharmacy  Contribution from his HIV is possible as well  An MRI of the brain would be ideal but We have not been able to acquire this  Given his psychiatric comorbidity and the language barrier he would need formal neuropsych testing done in Mongolian to say if there was any copathology from a neurodegenerative disease  This is very unlikely given his age  An MRI with and without contrast and NeuroQuant sequence would be ideal      If neuropsych testing (as arranged by his psychiatry team) indicates a neurodegenerative process he should return to see me in clinic for additional workup  Certainly the MRI  LP could be considered to look for evidence of CNS infection, HIV viral escape etc          Diagnoses and all orders for this visit:    Severe recurrent major depressive disorder with psychotic symptoms ([facility] [address] )    PTSD (post-traumatic stress disorder)    Disturbance of memory    Human immunodeficiency virus I infection ([facility] [address] )    Bipolar 1 disorder, depressed, severe ([facility] [address] )    Other orders  -     traZODone (DESYREL) 100 mg tablet; Take 100 mg by mouth  -     tadalafil (CIALIS) 20 MG tablet; Take 20 mg by mouth  -     Melatonin ER 10 MG TBCR; Take 10 mg by mouth  -     fluticasone-salmeterol (ADVAIR, WIXELA) 100-50 mcg/dose inhaler; Inhale 1 puff 2 (two) times a day  -     fluticasone (FLONASE) 50 mcg/act nasal spray; USE TWO SPRAYS IN EACH NOSTRIL TWICE DAILY  -     Cholecalciferol (VITAMIN D3) 2000 units capsule; TAKE 1 CAPSULE BY MOUTH ONCE A DAY  -     docusate sodium (COLACE) 100 mg capsule; Take 100 mg by mouth 2 (two) times a day as needed  -     cetirizine (ZyrTEC) 10 MG chewable tablet; Chew 10 mg daily        Total time spent today 50 minutes  Greater than 50% of total time was spent with the patient and / or family counseling and / or coordination of care      Subjective:     Patient ID: Swati Arzola is a 44 y o  male who presents today     History was acquired via all the phone   Swati Arzola is a 44year old man with HIV, depression, anxiety, bipolar, PTSD and psychosis on multiple psychiatric medications presents in follow-up for memory dysfunction  He previously followed with Dr Ether Sicard whose impression was that his memory dysfunction was related to his psychiatric disease  Given his history of HIV and his memory troubles an MRI with and without contrast was ordered but they were unable to acquire this due to insurance issues  EEG was acquired and was read as normal   The patient's mother has plans to see me in clinic for dementia and so the patient requested a transfer of care to me  The patient's mother and maternal grandmother both have memory troubles  The patient reports that his memory troubles started about a year ago though prior notes indicate up to 2 years ago  Reports taking the wrong bus when navigating city  Losing things around his house  He gets frustrated with himself and angry  He reports missing doses of medications at times and forgetting what he is doing mid task  Patient is not working now but reports in the past he worked as an   He is originally from [place] he graduated college in got 2 master's degrees in  and human resources      The following portions of the patient's history were reviewed and updated as appropriate: allergies, current medications, past family history, past medical history, past social history, past surgical history and problem list     Additional history of note:     Objective:      /70 (BP Location: Left arm, Patient Position: Sitting, Cuff Size: Adult)   Pulse 70   Resp 16   Ht 5' 8" (1 727 m)   Wt 82 2 kg (181 lb 4 8 oz)   BMI 27 57 kg/m²     Physical Exam    Neurological Exam    GENERAL MEDICAL EXAMINATION:  General appearance: alert, in no apparent distress  Appropriately dressed and groomed  Conversing and interacting appropriately  Eyes: Sclera are non-injected  Ears, nose, Mouth, Throat: Mucous membranes are moist    Resp: Breathing comfortably on RA   Musculoskeletal: No evidence of deformities  No contractures  No Edema  Skin: No visible rashes  Warm and well perfused  Psych: The patient is anxious appearing constantly rocking back and forth in his chair and taking at calluses on his palm  Mental Status: acquired via MA fluent in Norwegian     MoCA: 10/30  Visuospatial/executive: 3/5  Namin/3  Attention:   Language: 0/3  Abstraction:    Memory:  (0 additional with category; 2 with list)   Orientation: 3/6    Phonemic verbal fluency: 4 F words     General Neurology examination:   PERRL, EOMI, Face activates symmetrically  Normal bulk and tone throughout  Patient moves all 4 extremities equally and antigravity  No pronator drift  There were no adventitious movements noted  Finger to nose without dysmetria bilaterally  No intention tremor  Fine VINI were accurate and symmetric with regard to alice and speed  Gait:   Able to rise from a chair without the use of arms  Posture was normal  Narrow-base and stable stance  Normal initiation  Normal stride length, step height, arm swing  Review of Systems  10 point review of systems was completed and reviewed       Current Outpatient Medications on File Prior to Visit   Medication Sig Dispense Refill    abacavir-dolutegravir-lamiVUDine (TRIUMEQ) 600- mg per tablet Take 1 tablet by mouth daily      adapalene (DIFFERIN) 0 1 % gel       albuterol (PROVENTIL HFA,VENTOLIN HFA) 90 mcg/act inhaler       benzoyl peroxide 5 % external liquid Apply topically 2 (two) times a day      buPROPion (WELLBUTRIN XL) 150 mg 24 hr tablet Take 150 mg by mouth daily      celecoxib (CeleBREX) 50 MG capsule TAKE 1 TO 2 CAPSULES BY MOUTH EVERY 12 HOURS WITH FOOD      cetirizine (ZyrTEC) 10 MG chewable tablet Chew 10 mg daily      cetirizine (ZyrTEC) 10 MG chewable tablet Chew 10 mg daily      chlorhexidine (HIBICLENS) 4 % external liquid CIPRIANO DIARIAMENTE CUANDO SEA NECESARIO PARA HERIDA ALEJANDRO FUE DIRIJIDO      Cholecalciferol (VITAMIN D3) 2000 units capsule       Cholecalciferol (VITAMIN D3) 2000 units capsule TAKE 1 CAPSULE BY MOUTH ONCE A DAY      clindamycin (CLEOCIN T) 1 % lotion       clonazePAM (KlonoPIN) 0 5 mg tablet       clonazePAM (KlonoPIN) 1 mg tablet 1 to 2 tablets as needed      dextran 70-hypromellose (GENTEAL TEARS) 0 1-0 3 % ophthalmic solution PONER 2 GOTAS EN EL FELIX(S) 2 VECES AL LINDA      docusate sodium (COLACE) 100 mg capsule Take 100 mg by mouth 2 (two) times a day as needed      elvitegravir-cobicistat-emtricitabine-tenofovir (STRIBILD) [phone] mg Take 1 tablet by mouth Daily      famotidine (PEPCID) 20 mg tablet TAKE ONE TABLET BY MOUTH 2 TIMES A DAY AS NEEDED FOR HEARTBURN      fluticasone (FLONASE) 50 mcg/act nasal spray USE TWO SPRAYS IN EACH NOSTRIL TWICE DAILY      fluticasone-salmeterol (ADVAIR, WIXELA) 100-50 mcg/dose inhaler Inhale 1 puff 2 (two) times a day      fluticasone-vilanterol (BREO ELLIPTA) 100-25 mcg/inh inhaler Inhale 1 puff daily      gabapentin (NEURONTIN) 800 mg tablet Take 800 mg by mouth 3 (three) times a day       GENTEAL TEARS 0 1-0 3 % ophthalmic solution       LATUDA 80 MG tablet       Melatonin ER 10 MG TBCR Take 10 mg by mouth      montelukast (SINGULAIR) 10 mg tablet Take 10 mg by mouth      Multiple Vitamin (THERA-TABS) TABS       NASAL DECONGESTANT SPRAY 0 05 % nasal spray       pantoprazole (PROTONIX) 40 mg tablet TAKE 1 TABLET BY MOUTH EVERY 12 HOURS      pseudoephedrine-guaifenesin (MUCINEX D)  MG per tablet Take 1 tablet by mouth every 12 (twelve) hours      QUEtiapine (SEROquel) 100 mg tablet       QUEtiapine (SEROquel) 300 mg tablet Take 300 mg by mouth      QUEtiapine (SEROquel) 400 MG tablet       sucralfate (CARAFATE) 1 g tablet TAKE 1 TABLET BY MOUTH FOUR TIMES A DAY (BEFORE MEALS AND AT BEDTIME)      tadalafil (CIALIS) 20 MG tablet Take 20 mg by mouth      traMADol (ULTRAM) 50 mg tablet Take one to two as needed for moderate pain  Do not exceed 2 tabs a day      traZODone (DESYREL) 100 mg tablet Take 100 mg by mouth      Triamcinolone Acetonide 55 MCG/ACT AERO ECHARSE 1 CAROLINA EN CADA FOSA NASAL DIARIAMENTE      Triamcinolone Acetonide 55 MCG/ACT AERO       TRIUMEQ 600- MG per tablet        No current facility-administered medications on file prior to visit          Jovan Mata MD  Medical Director   [facility]  [facility]

## 2019-10-03 NOTE — ASSESSMENT & PLAN NOTE
44year old man with significant psychiatric disease and HIV presents for evaluation of memory complaints  I agree with prior assessments that the bulk of his cognitive dysfunction is related to his psychiatric disease and polypharmacy  Contribution from his HIV is possible as well  An MRI of the brain would be ideal but We have not been able to acquire this  Given his psychiatric comorbidity and the language barrier he would need formal neuropsych testing done in [place] to say if there was any copathology from a neurodegenerative disease  This is very unlikely given his age  An MRI with and without contrast and NeuroQuant sequence would be ideal      If neuropsych testing (as arranged by his psychiatry team) indicates a neurodegenerative process he should return to see me in clinic for additional workup  Certainly the MRI   LP could be considered to look for evidence of CNS infection, HIV viral escape etc

## 2019-10-03 NOTE — LETTER
October 3, 2019     Usha Walker, [address] [place] [address]    Patient: Faby Simon   YOB: 1980   Date of Visit: 10/3/2019       Dear Dr Jose Alejandro Davis: Thank you for referring Faby Simon to me for evaluation  Below are my notes for this consultation  If you have questions, please do not hesitate to call me  I look forward to following your patient along with you  Sincerely,        Gamaliel Woodard MD        CC: MD Gamaliel Quarles MD  10/3/2019 12:28 PM  Sign at close encounter  Assessment/Plan:    Disturbance of memory  44year old man with significant psychiatric disease and HIV presents for evaluation of memory complaints  I agree with prior assessments that the bulk of his cognitive dysfunction is related to his psychiatric disease and polypharmacy  Contribution from his HIV is possible as well  An MRI of the brain would be ideal but We have not been able to acquire this  Given his psychiatric comorbidity and the language barrier he would need formal neuropsych testing done in Danish to say if there was any copathology from a neurodegenerative disease  This is very unlikely given his age  An MRI with and without contrast and NeuroQuant sequence would be ideal      If neuropsych testing (as arranged by his psychiatry team) indicates a neurodegenerative process he should return to see me in clinic for additional workup  Certainly the MRI  LP could be considered to look for evidence of CNS infection, HIV viral escape etc          Diagnoses and all orders for this visit:    Severe recurrent major depressive disorder with psychotic symptoms ([facility] [address] )    PTSD (post-traumatic stress disorder)    Disturbance of memory    Human immunodeficiency virus I infection ([facility] [address] )    Bipolar 1 disorder, depressed, severe ([address] )    Other orders  -     traZODone (DESYREL) 100 mg tablet; Take 100 mg by mouth  -     tadalafil (CIALIS) 20 MG tablet;  Take 20 mg by mouth  -     Melatonin ER 10 MG TBCR; Take 10 mg by mouth  -     fluticasone-salmeterol (ADVAIR, WIXELA) 100-50 mcg/dose inhaler; Inhale 1 puff 2 (two) times a day  -     fluticasone (FLONASE) 50 mcg/act nasal spray; USE TWO SPRAYS IN EACH NOSTRIL TWICE DAILY  -     Cholecalciferol (VITAMIN D3) 2000 units capsule; TAKE 1 CAPSULE BY MOUTH ONCE A DAY  -     docusate sodium (COLACE) 100 mg capsule; Take 100 mg by mouth 2 (two) times a day as needed  -     cetirizine (ZyrTEC) 10 MG chewable tablet; Chew 10 mg daily        Total time spent today 50 minutes  Greater than 50% of total time was spent with the patient and / or family counseling and / or coordination of care      Subjective:     Patient ID: Harley Nash is a 44 y o  male who presents today     History was acquired via all the phone   Harley Nash is a 44year old man with HIV, depression, anxiety, bipolar, PTSD and psychosis on multiple psychiatric medications presents in follow-up for memory dysfunction  He previously followed with Dr Michael Gaxiola whose impression was that his memory dysfunction was related to his psychiatric disease  Given his history of HIV and his memory troubles an MRI with and without contrast was ordered but they were unable to acquire this due to insurance issues  EEG was acquired and was read as normal   The patient's mother has plans to see me in clinic for dementia and so the patient requested a transfer of care to me  The patient's mother and maternal grandmother both have memory troubles  The patient reports that his memory troubles started about a year ago though prior notes indicate up to 2 years ago  Reports taking the wrong bus when navigating city  Losing things around his house  He gets frustrated with himself and angry  He reports missing doses of medications at times and forgetting what he is doing mid task  Patient is not working now but reports in the past he worked as an     [place] is originally from [place] he graduated college in [employer] 2 master's degrees in  and human resources  The following portions of the patient's history were reviewed and updated as appropriate: allergies, current medications, past family history, past medical history, past social history, past surgical history and problem list     Additional history of note:     Objective:      /70 (BP Location: Left arm, Patient Position: Sitting, Cuff Size: Adult)   Pulse 70   Resp 16   Ht 5' 8" (1 727 m)   Wt 82 2 kg (181 lb 4 8 oz)   BMI 27 57 kg/m²      Physical Exam    Neurological Exam    GENERAL MEDICAL EXAMINATION:  General appearance: alert, in no apparent distress  Appropriately dressed and groomed  Conversing and interacting appropriately  Eyes: Sclera are non-injected  Ears, nose, Mouth, Throat: Mucous membranes are moist    Resp: Breathing comfortably on RA   Musculoskeletal: No evidence of deformities  No contractures  No Edema  Skin: No visible rashes  Warm and well perfused  Psych: The patient is anxious appearing constantly rocking back and forth in his chair and taking at calluses on his palm  Mental Status: acquired via MA fluent in Pakistani     MoCA: 10/30  Visuospatial/executive: 3  Namin/3  Attention:   Language: 03  Abstraction: 1   Memory: 0 (0 additional with category; 2 with list)   Orientation: 3/6    Phonemic verbal fluency: 4 F words     General Neurology examination:   PERRL, EOMI, Face activates symmetrically  Normal bulk and tone throughout  Patient moves all 4 extremities equally and antigravity  No pronator drift  There were no adventitious movements noted  Finger to nose without dysmetria bilaterally  No intention tremor  Fine VINI were accurate and symmetric with regard to alice and speed  Gait:   Able to rise from a chair without the use of arms  Posture was normal  Narrow-base and stable stance  Normal initiation   Normal stride length, step height, arm swing  Review of Systems  10 point review of systems was completed and reviewed       Current Outpatient Medications on File Prior to Visit   Medication Sig Dispense Refill    abacavir-dolutegravir-lamiVUDine (TRIUMEQ) 600- mg per tablet Take 1 tablet by mouth daily      adapalene (DIFFERIN) 0 1 % gel       albuterol (PROVENTIL HFA,VENTOLIN HFA) 90 mcg/act inhaler       benzoyl peroxide 5 % external liquid Apply topically 2 (two) times a day      buPROPion (WELLBUTRIN XL) 150 mg 24 hr tablet Take 150 mg by mouth daily      celecoxib (CeleBREX) 50 MG capsule TAKE 1 TO 2 CAPSULES BY MOUTH EVERY 12 HOURS WITH FOOD      cetirizine (ZyrTEC) 10 MG chewable tablet Chew 10 mg daily      cetirizine (ZyrTEC) 10 MG chewable tablet Chew 10 mg daily      chlorhexidine (HIBICLENS) 4 % external liquid CIPRIANO DIARIAMENTE CUANDO SEA NECESARIO PARA HERIDA ALEJANDRO FUE DIRIJIDO      Cholecalciferol (VITAMIN D3) 2000 units capsule       Cholecalciferol (VITAMIN D3) 2000 units capsule TAKE 1 CAPSULE BY MOUTH ONCE A DAY      clindamycin (CLEOCIN T) 1 % lotion       clonazePAM (KlonoPIN) 0 5 mg tablet       clonazePAM (KlonoPIN) 1 mg tablet 1 to 2 tablets as needed      dextran 70-hypromellose (GENTEAL TEARS) 0 1-0 3 % ophthalmic solution PONER 2 GOTAS EN EL FELIX(S) 2 VECES AL LINDA      docusate sodium (COLACE) 100 mg capsule Take 100 mg by mouth 2 (two) times a day as needed      elvitegravir-cobicistat-emtricitabine-tenofovir (STRIBILD) 893-824-053-300 mg Take 1 tablet by mouth Daily      famotidine (PEPCID) 20 mg tablet TAKE ONE TABLET BY MOUTH 2 TIMES A DAY AS NEEDED FOR HEARTBURN      fluticasone (FLONASE) 50 mcg/act nasal spray USE TWO SPRAYS IN EACH NOSTRIL TWICE DAILY      fluticasone-salmeterol (ADVAIR, WIXELA) 100-50 mcg/dose inhaler Inhale 1 puff 2 (two) times a day      fluticasone-vilanterol (BREO ELLIPTA) 100-25 mcg/inh inhaler Inhale 1 puff daily      gabapentin (NEURONTIN) 800 mg tablet Take 800 mg by mouth 3 (three) times a day       GENTEAL TEARS 0 1-0 3 % ophthalmic solution       LATUDA 80 MG tablet       Melatonin ER 10 MG TBCR Take 10 mg by mouth      montelukast (SINGULAIR) 10 mg tablet Take 10 mg by mouth      Multiple Vitamin (THERA-TABS) TABS       NASAL DECONGESTANT SPRAY 0 05 % nasal spray       pantoprazole (PROTONIX) 40 mg tablet TAKE 1 TABLET BY MOUTH EVERY 12 HOURS      pseudoephedrine-guaifenesin (MUCINEX D)  MG per tablet Take 1 tablet by mouth every 12 (twelve) hours      QUEtiapine (SEROquel) 100 mg tablet       QUEtiapine (SEROquel) 300 mg tablet Take 300 mg by mouth      QUEtiapine (SEROquel) 400 MG tablet       sucralfate (CARAFATE) 1 g tablet TAKE 1 TABLET BY MOUTH FOUR TIMES A DAY (BEFORE MEALS AND AT BEDTIME)      tadalafil (CIALIS) 20 MG tablet Take 20 mg by mouth      traMADol (ULTRAM) 50 mg tablet Take one to two as needed for moderate pain  Do not exceed 2 tabs a day      traZODone (DESYREL) 100 mg tablet Take 100 mg by mouth      Triamcinolone Acetonide 55 MCG/ACT AERO ECHARSE 1 CAROLINA EN CADA FOSA NASAL DIARIAMENTE      Triamcinolone Acetonide 55 MCG/ACT AERO       TRIUMEQ 600- MG per tablet        No current facility-administered medications on file prior to visit          Monica Rousseau MD  Medical Director   [facility]  [facility]

## 2019-10-09 ENCOUNTER — TELEPHONE (OUTPATIENT)
Dept: NEUROLOGY | Facility: CLINIC | Age: 39
End: 2019-10-09

## 2019-10-09 NOTE — TELEPHONE ENCOUNTER
Pt called back to discuss medical records - was in need of  - I called and spoke with Vanessa (RN) she said that she would call him back with a

## 2019-10-09 NOTE — TELEPHONE ENCOUNTER
Called patient using  ([phone]) Yann Rahman  Patient called office earlier today requesting information  Called back with   Patient stating he is saw the neurologist 1 1/2 weeks ago and he was supposed to send a note to psychiatry  They still haven't received the note  I see a letter was drafted and sent on 10/3/19 to Dr Kenzie Castelan and CC'd to Dr Shanon Saucedo MD  Patient requesting letter be faxed to Dr Marvin Areas directly  Agreed to do so  Patient has no other requests  Confirmed fax sent by patient request to [phone]

## 2019-10-14 ENCOUNTER — TELEPHONE (OUTPATIENT)
Dept: NEUROLOGY | Facility: CLINIC | Age: 39
End: 2019-10-14

## 2019-10-14 NOTE — TELEPHONE ENCOUNTER
Patient was at the office with his mother and requested to have his office notes from Dr Cruzito Santiago visit on 10  3 2019 to be faxed to his    FREDERICK completed and office notes faxed to [phone]

## 2019-10-31 ENCOUNTER — TELEPHONE (OUTPATIENT)
Dept: NEUROLOGY | Facility: CLINIC | Age: 39
End: 2019-10-31

## 2019-10-31 DIAGNOSIS — R41.3 AMNESIA/MEMORY DISORDER: Primary | ICD-10-CM

## 2019-10-31 DIAGNOSIS — F33.3 SEVERE RECURRENT MAJOR DEPRESSIVE DISORDER WITH PSYCHOTIC SYMPTOMS (HCC): ICD-10-CM

## 2019-10-31 NOTE — TELEPHONE ENCOUNTER
Pt calls in asking for a call back with French interpretor  I am connected with Fifi Jerome [phone]  Pt asking if you could place an order for neuropsych testing  He states that his psychiatrist is asking us to place order  Once order is placed he would like it faxed to his  who is going to help him find a facility that would conduct the test in [address] [facility]  : Maricel Blas  Phone [phone] fax [phone]      Pt only needs call back if Dr Wendi Moya is not agreeable

## 2019-11-07 NOTE — TELEPHONE ENCOUNTER
Pt called requesting for a call back w/   Called pt w/  Gregorio Batista #[id number]  Pt is requesting referral be faxed to [phone] @ [phone]   Fax sent      Heidi Lewis  [phone]

## 2019-12-03 ENCOUNTER — TELEPHONE (OUTPATIENT)
Dept: NEUROLOGY | Facility: CLINIC | Age: 39
End: 2019-12-03

## 2019-12-03 NOTE — TELEPHONE ENCOUNTER
We do not have a provider who does neuropsych testing in Malagasy  He could call [facility] to inquire, I believe they do

## 2019-12-03 NOTE — TELEPHONE ENCOUNTER
Called [facility] to help set up an appointment with patient but they do not accept patient's insurance  Called patient and left message letting him know and advised to call his insurance to get a list of Neuropsych physicians who participate with his insurance

## 2019-12-03 NOTE — TELEPHONE ENCOUNTER
Patient called stating that his psychiatrist does not do Neuropsych testing ordered by Dr Marifer Lei  Patient was wondering if he can be referred to someone in [facility] who can perform the Neuropsych testing instead  Patient would like a call back regarding the matter  Thanks!

## 2022-07-15 ENCOUNTER — PATIENT OUTREACH (OUTPATIENT)
Dept: SURGERY | Facility: CLINIC | Age: 42
End: 2022-07-15

## 2022-08-11 ENCOUNTER — PATIENT OUTREACH (OUTPATIENT)
Dept: SURGERY | Facility: CLINIC | Age: 42
End: 2022-08-11

## 2022-08-12 ENCOUNTER — PATIENT OUTREACH (OUTPATIENT)
Dept: SURGERY | Facility: CLINIC | Age: 42
End: 2022-08-12

## 2022-08-17 ENCOUNTER — PATIENT OUTREACH (OUTPATIENT)
Dept: SURGERY | Facility: CLINIC | Age: 42
End: 2022-08-17

## 2022-08-18 ENCOUNTER — PATIENT OUTREACH (OUTPATIENT)
Dept: SURGERY | Facility: CLINIC | Age: 42
End: 2022-08-18

## 2022-08-19 ENCOUNTER — PATIENT OUTREACH (OUTPATIENT)
Dept: SURGERY | Facility: CLINIC | Age: 42
End: 2022-08-19

## 2022-08-19 NOTE — PROGRESS NOTES
CM reached out to Ct for missed appointment, CM left voicemail to reschedule for following week  Ct to follow up with PEYTON

## 2022-08-19 NOTE — PROGRESS NOTES
CM spoke with Tamela England for missed appointment and upcoming authorization  CM to follow up with Ct for new appointment time  Ct did not provide phone call or text for reason of missed appointment

## 2022-08-22 ENCOUNTER — PATIENT OUTREACH (OUTPATIENT)
Dept: SURGERY | Facility: CLINIC | Age: 42
End: 2022-08-22

## 2022-08-23 ENCOUNTER — PATIENT OUTREACH (OUTPATIENT)
Dept: SURGERY | Facility: CLINIC | Age: 42
End: 2022-08-23

## 2022-09-01 NOTE — PROGRESS NOTES
CM prepared letter for ct due to missed appointment, CM contacted Ct for intake to continue receiving housing assistance

## 2022-09-01 NOTE — PROGRESS NOTES
[facility] OF [place], [facility]  prepared check requisition and sent to Stanley Beavers for processing and approval

## 2022-09-01 NOTE — PROGRESS NOTES
CM sent Ct appointment reminder via text including all required documentation  Ct did not respond to text

## 2022-09-01 NOTE — PROGRESS NOTES
Cm contacted Ct to schedule appointment, Ct confirmed  CM discussed case with Zi Escobar 79, Zi Carrera informed CM of past challenges with appointment and program adherence in regards to documentation  CM to send appointment reminder to Ct

## 2022-09-12 ENCOUNTER — PATIENT OUTREACH (OUTPATIENT)
Dept: SURGERY | Facility: CLINIC | Age: 42
End: 2022-09-12

## 2022-09-12 NOTE — PROGRESS NOTES
CM contacted by Giuliana Estevez states he thought appointment was for September 17th  CM informed Ct appointment was for last month  Ct requested appointment for September 21st, CM confirmed availability for 1 pm  Ct confirmed this time works for him and CM informed him of all necessary documents for both intake and renewal  CM to send reminder text to Ct

## 2022-09-13 ENCOUNTER — PATIENT OUTREACH (OUTPATIENT)
Dept: SURGERY | Facility: CLINIC | Age: 42
End: 2022-09-13

## 2022-09-20 ENCOUNTER — PATIENT OUTREACH (OUTPATIENT)
Dept: SURGERY | Facility: CLINIC | Age: 42
End: 2022-09-20

## 2022-09-20 NOTE — PROGRESS NOTES
HC sent Ct reminder, contacted by Ct for confirmation and directions  Ct asked for directions for the parking garage and if parking was easy  HC confirmed parking garage onsite, will be outside office in case  Ct states he understood and he will arrive for his appointment tomorrow at [facility] made aware that if they are more than 15 minutes late, appointment is automatically canceled

## 2022-09-21 ENCOUNTER — PATIENT OUTREACH (OUTPATIENT)
Dept: SURGERY | Facility: CLINIC | Age: 42
End: 2022-09-21
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
## 2022-09-22 ENCOUNTER — PATIENT OUTREACH (OUTPATIENT)
Dept: SURGERY | Facility: CLINIC | Age: 42
End: 2022-09-22

## 2022-09-22 NOTE — PROGRESS NOTES
HC uploaded FREDERICK Signed by Ct, [facility]  informed Ct that renewal will not be until 04/2023  Ct understood, Ct to provide CM with updates

## 2022-09-27 ENCOUNTER — PATIENT OUTREACH (OUTPATIENT)
Dept: SURGERY | Facility: CLINIC | Age: 42
End: 2022-09-27

## 2022-10-03 ENCOUNTER — PATIENT OUTREACH (OUTPATIENT)
Dept: SURGERY | Facility: CLINIC | Age: 42
End: 2022-10-03

## 2022-10-05 ENCOUNTER — PATIENT OUTREACH (OUTPATIENT)
Dept: SURGERY | Facility: CLINIC | Age: 42
End: 2022-10-05

## 2022-10-07 ENCOUNTER — PATIENT OUTREACH (OUTPATIENT)
Dept: SURGERY | Facility: CLINIC | Age: 42
End: 2022-10-07

## 2022-10-07 NOTE — PROGRESS NOTES
HC contacted by Lyle Jin requests a proof of housing assistance letter for welfare and social security  HC agreed to write letter, [facility] OF [place], [facility]  to contact Ct for letter

## 2022-10-20 ENCOUNTER — PATIENT OUTREACH (OUTPATIENT)
Dept: SURGERY | Facility: CLINIC | Age: 42
End: 2022-10-20

## 2022-11-17 ENCOUNTER — PATIENT OUTREACH (OUTPATIENT)
Dept: SURGERY | Facility: CLINIC | Age: 42
End: 2022-11-17

## 2022-12-14 ENCOUNTER — PATIENT OUTREACH (OUTPATIENT)
Dept: SURGERY | Facility: CLINIC | Age: 42
End: 2022-12-14

## 2022-12-14 NOTE — PROGRESS NOTES
HC got TBRA check requisition signed  HC sent to [facility] for processing and accounts payable

## 2023-01-05 ENCOUNTER — PATIENT OUTREACH (OUTPATIENT)
Dept: SURGERY | Facility: CLINIC | Age: 43
End: 2023-01-05

## 2023-01-16 ENCOUNTER — PATIENT OUTREACH (OUTPATIENT)
Dept: SURGERY | Facility: CLINIC | Age: 43
End: 2023-01-16

## 2023-01-16 NOTE — PROGRESS NOTES
HC got TBRA check requisition signed and uploaded to media  HC sent to [facility] for processing and accounts payable

## 2023-02-17 ENCOUNTER — PATIENT OUTREACH (OUTPATIENT)
Dept: SURGERY | Facility: CLINIC | Age: 43
End: 2023-02-17

## 2023-02-17 NOTE — PROGRESS NOTES
HC got TBRA check requisition signed and uploaded to media  HC sent to [facility] for processing and accounts payable

## 2023-03-02 ENCOUNTER — PATIENT OUTREACH (OUTPATIENT)
Dept: SURGERY | Facility: CLINIC | Age: 43
End: 2023-03-02

## 2023-03-10 ENCOUNTER — PATIENT OUTREACH (OUTPATIENT)
Dept: SURGERY | Facility: CLINIC | Age: 43
End: 2023-03-10

## 2023-03-16 ENCOUNTER — PATIENT OUTREACH (OUTPATIENT)
Dept: SURGERY | Facility: CLINIC | Age: 43
End: 2023-03-16

## 2023-03-27 ENCOUNTER — PATIENT OUTREACH (OUTPATIENT)
Dept: SURGERY | Facility: CLINIC | Age: 43
End: 2023-03-27

## 2023-03-29 ENCOUNTER — PATIENT OUTREACH (OUTPATIENT)
Dept: SURGERY | Facility: CLINIC | Age: 43
End: 2023-03-29

## 2023-04-20 ENCOUNTER — PATIENT OUTREACH (OUTPATIENT)
Dept: SURGERY | Facility: CLINIC | Age: 43
End: 2023-04-20

## 2023-05-01 ENCOUNTER — PATIENT OUTREACH (OUTPATIENT)
Dept: SURGERY | Facility: CLINIC | Age: 43
End: 2023-05-01

## 2023-05-08 ENCOUNTER — PATIENT OUTREACH (OUTPATIENT)
Dept: SURGERY | Facility: CLINIC | Age: 43
End: 2023-05-08

## 2023-05-09 ENCOUNTER — PATIENT OUTREACH (OUTPATIENT)
Dept: SURGERY | Facility: CLINIC | Age: 43
End: 2023-05-09

## 2023-05-10 ENCOUNTER — PATIENT OUTREACH (OUTPATIENT)
Dept: SURGERY | Facility: CLINIC | Age: 43
End: 2023-05-10

## 2023-05-15 NOTE — PROGRESS NOTES
CT CALLED CM TO NOTIFY RENT PORTION HAS NOT CHANGED IN [place], CT PROVIDED UPDATE REGARDING APARTMENT

## 2023-05-17 ENCOUNTER — PATIENT OUTREACH (OUTPATIENT)
Dept: SURGERY | Facility: CLINIC | Age: 43
End: 2023-05-17

## 2023-05-18 ENCOUNTER — PATIENT OUTREACH (OUTPATIENT)
Dept: SURGERY | Facility: CLINIC | Age: 43
End: 2023-05-18

## 2023-05-19 ENCOUNTER — PATIENT OUTREACH (OUTPATIENT)
Dept: SURGERY | Facility: CLINIC | Age: 43
End: 2023-05-19

## 2023-05-22 ENCOUNTER — PATIENT OUTREACH (OUTPATIENT)
Dept: SURGERY | Facility: CLINIC | Age: 43
End: 2023-05-22

## 2023-05-24 ENCOUNTER — PATIENT OUTREACH (OUTPATIENT)
Dept: SURGERY | Facility: CLINIC | Age: 43
End: 2023-05-24

## 2023-05-26 ENCOUNTER — PATIENT OUTREACH (OUTPATIENT)
Dept: SURGERY | Facility: CLINIC | Age: 43
End: 2023-05-26

## 2023-05-30 ENCOUNTER — PATIENT OUTREACH (OUTPATIENT)
Dept: SURGERY | Facility: CLINIC | Age: 43
End: 2023-05-30

## 2023-05-31 ENCOUNTER — PATIENT OUTREACH (OUTPATIENT)
Dept: SURGERY | Facility: CLINIC | Age: 43
End: 2023-05-31

## 2023-06-09 ENCOUNTER — PATIENT OUTREACH (OUTPATIENT)
Dept: SURGERY | Facility: CLINIC | Age: 43
End: 2023-06-09

## 2023-06-12 ENCOUNTER — PATIENT OUTREACH (OUTPATIENT)
Dept: SURGERY | Facility: CLINIC | Age: 43
End: 2023-06-12

## 2023-06-16 ENCOUNTER — PATIENT OUTREACH (OUTPATIENT)
Dept: SURGERY | Facility: CLINIC | Age: 43
End: 2023-06-16

## 2023-06-20 ENCOUNTER — PATIENT OUTREACH (OUTPATIENT)
Dept: SURGERY | Facility: CLINIC | Age: 43
End: 2023-06-20

## 2023-07-21 ENCOUNTER — PATIENT OUTREACH (OUTPATIENT)
Dept: SURGERY | Facility: CLINIC | Age: 43
End: 2023-07-21

## 2023-08-08 ENCOUNTER — PATIENT OUTREACH (OUTPATIENT)
Dept: SURGERY | Facility: CLINIC | Age: 43
End: 2023-08-08

## 2023-08-15 ENCOUNTER — PATIENT OUTREACH (OUTPATIENT)
Dept: SURGERY | Facility: CLINIC | Age: 43
End: 2023-08-15

## 2023-08-16 ENCOUNTER — PATIENT OUTREACH (OUTPATIENT)
Dept: SURGERY | Facility: CLINIC | Age: 43
End: 2023-08-16

## 2023-08-17 ENCOUNTER — PATIENT OUTREACH (OUTPATIENT)
Dept: SURGERY | Facility: CLINIC | Age: 43
End: 2023-08-17

## 2023-08-17 NOTE — PROGRESS NOTES
HC contacted by Ct Ct requested letter for rent portion for his food stamps renewal and assistance with ontrack. HC let Ct [facility] no longer CM's ct. HC contacted by . HC discussed with EMIR Hensley, [facility]. Shellie contacted Ct and Ct has not responded to her.

## 2023-08-18 ENCOUNTER — PATIENT OUTREACH (OUTPATIENT)
Dept: SURGERY | Facility: CLINIC | Age: 43
End: 2023-08-18

## 2023-08-30 ENCOUNTER — PATIENT OUTREACH (OUTPATIENT)
Dept: SURGERY | Facility: CLINIC | Age: 43
End: 2023-08-30

## 2023-08-30 NOTE — PROGRESS NOTES
HC assisted [facility] [place] with providing Ct documentation, letter for [facility]

## 2023-08-31 ENCOUNTER — PATIENT OUTREACH (OUTPATIENT)
Dept: SURGERY | Facility: CLINIC | Age: 43
End: 2023-08-31

## 2023-09-13 ENCOUNTER — PATIENT OUTREACH (OUTPATIENT)
Dept: SURGERY | Facility: CLINIC | Age: 43
End: 2023-09-13

## 2023-10-10 ENCOUNTER — PATIENT OUTREACH (OUTPATIENT)
Dept: SURGERY | Facility: CLINIC | Age: 43
End: 2023-10-10

## 2023-10-16 ENCOUNTER — PATIENT OUTREACH (OUTPATIENT)
Dept: SURGERY | Facility: CLINIC | Age: 43
End: 2023-10-16

## 2023-11-09 ENCOUNTER — PATIENT OUTREACH (OUTPATIENT)
Dept: SURGERY | Facility: CLINIC | Age: 43
End: 2023-11-09

## 2023-11-14 ENCOUNTER — PATIENT OUTREACH (OUTPATIENT)
Dept: SURGERY | Facility: CLINIC | Age: 43
End: 2023-11-14

## 2023-11-22 ENCOUNTER — PATIENT OUTREACH (OUTPATIENT)
Dept: SURGERY | Facility: CLINIC | Age: 43
End: 2023-11-22

## 2023-12-06 ENCOUNTER — PATIENT OUTREACH (OUTPATIENT)
Dept: SURGERY | Facility: CLINIC | Age: 43
End: 2023-12-06

## 2023-12-14 ENCOUNTER — PATIENT OUTREACH (OUTPATIENT)
Dept: SURGERY | Facility: CLINIC | Age: 43
End: 2023-12-14

## 2023-12-18 ENCOUNTER — PATIENT OUTREACH (OUTPATIENT)
Dept: SURGERY | Facility: CLINIC | Age: 43
End: 2023-12-18

## 2024-01-03 ENCOUNTER — PATIENT OUTREACH (OUTPATIENT)
Dept: SURGERY | Facility: CLINIC | Age: 44
End: 2024-01-03

## 2024-01-16 ENCOUNTER — PATIENT OUTREACH (OUTPATIENT)
Dept: SURGERY | Facility: CLINIC | Age: 44
End: 2024-01-16

## 2024-01-30 ENCOUNTER — PATIENT OUTREACH (OUTPATIENT)
Dept: SURGERY | Facility: CLINIC | Age: 44
End: 2024-01-30

## 2024-02-01 ENCOUNTER — PATIENT OUTREACH (OUTPATIENT)
Dept: SURGERY | Facility: CLINIC | Age: 44
End: 2024-02-01

## 2024-03-07 ENCOUNTER — PATIENT OUTREACH (OUTPATIENT)
Dept: SURGERY | Facility: CLINIC | Age: 44
End: 2024-03-07

## 2024-03-21 ENCOUNTER — PATIENT OUTREACH (OUTPATIENT)
Dept: SURGERY | Facility: CLINIC | Age: 44
End: 2024-03-21

## 2024-03-28 ENCOUNTER — PATIENT OUTREACH (OUTPATIENT)
Dept: SURGERY | Facility: CLINIC | Age: 44
End: 2024-03-28

## 2024-04-01 ENCOUNTER — PATIENT OUTREACH (OUTPATIENT)
Dept: SURGERY | Facility: CLINIC | Age: 44
End: 2024-04-01

## 2024-04-05 ENCOUNTER — PATIENT OUTREACH (OUTPATIENT)
Dept: SURGERY | Facility: CLINIC | Age: 44
End: 2024-04-05

## 2024-04-11 ENCOUNTER — PATIENT OUTREACH (OUTPATIENT)
Dept: SURGERY | Facility: CLINIC | Age: 44
End: 2024-04-11

## 2024-04-12 ENCOUNTER — PATIENT OUTREACH (OUTPATIENT)
Dept: SURGERY | Facility: CLINIC | Age: 44
End: 2024-04-12

## 2024-04-15 ENCOUNTER — PATIENT OUTREACH (OUTPATIENT)
Dept: SURGERY | Facility: CLINIC | Age: 44
End: 2024-04-15

## 2024-04-16 ENCOUNTER — PATIENT OUTREACH (OUTPATIENT)
Dept: SURGERY | Facility: CLINIC | Age: 44
End: 2024-04-16

## 2024-04-19 ENCOUNTER — PATIENT OUTREACH (OUTPATIENT)
Dept: SURGERY | Facility: CLINIC | Age: 44
End: 2024-04-19

## 2024-04-24 ENCOUNTER — PATIENT OUTREACH (OUTPATIENT)
Dept: SURGERY | Facility: CLINIC | Age: 44
End: 2024-04-24

## 2024-04-26 ENCOUNTER — PATIENT OUTREACH (OUTPATIENT)
Dept: SURGERY | Facility: CLINIC | Age: 44
End: 2024-04-26

## 2024-04-29 ENCOUNTER — PATIENT OUTREACH (OUTPATIENT)
Dept: SURGERY | Facility: CLINIC | Age: 44
End: 2024-04-29

## 2024-04-30 ENCOUNTER — PATIENT OUTREACH (OUTPATIENT)
Dept: SURGERY | Facility: CLINIC | Age: 44
End: 2024-04-30

## 2024-05-02 ENCOUNTER — PATIENT OUTREACH (OUTPATIENT)
Dept: SURGERY | Facility: CLINIC | Age: 44
End: 2024-05-02

## 2024-05-03 ENCOUNTER — PATIENT OUTREACH (OUTPATIENT)
Dept: SURGERY | Facility: CLINIC | Age: 44
End: 2024-05-03

## 2024-05-06 ENCOUNTER — PATIENT OUTREACH (OUTPATIENT)
Dept: SURGERY | Facility: CLINIC | Age: 44
End: 2024-05-06

## 2024-05-07 ENCOUNTER — PATIENT OUTREACH (OUTPATIENT)
Dept: SURGERY | Facility: CLINIC | Age: 44
End: 2024-05-07

## 2024-05-14 ENCOUNTER — PATIENT OUTREACH (OUTPATIENT)
Dept: SURGERY | Facility: CLINIC | Age: 44
End: 2024-05-14

## 2024-05-15 ENCOUNTER — PATIENT OUTREACH (OUTPATIENT)
Dept: SURGERY | Facility: CLINIC | Age: 44
End: 2024-05-15

## 2024-05-17 ENCOUNTER — PATIENT OUTREACH (OUTPATIENT)
Dept: SURGERY | Facility: CLINIC | Age: 44
End: 2024-05-17

## 2024-05-28 ENCOUNTER — PATIENT OUTREACH (OUTPATIENT)
Dept: SURGERY | Facility: CLINIC | Age: 44
End: 2024-05-28

## 2024-06-05 ENCOUNTER — PATIENT OUTREACH (OUTPATIENT)
Dept: SURGERY | Facility: CLINIC | Age: 44
End: 2024-06-05

## 2024-06-06 ENCOUNTER — PATIENT OUTREACH (OUTPATIENT)
Dept: SURGERY | Facility: CLINIC | Age: 44
End: 2024-06-06

## 2024-06-10 ENCOUNTER — PATIENT OUTREACH (OUTPATIENT)
Dept: SURGERY | Facility: CLINIC | Age: 44
End: 2024-06-10

## 2024-06-17 ENCOUNTER — PATIENT OUTREACH (OUTPATIENT)
Dept: SURGERY | Facility: CLINIC | Age: 44
End: 2024-06-17

## 2024-06-18 ENCOUNTER — PATIENT OUTREACH (OUTPATIENT)
Dept: SURGERY | Facility: CLINIC | Age: 44
End: 2024-06-18

## 2024-06-21 ENCOUNTER — PATIENT OUTREACH (OUTPATIENT)
Dept: SURGERY | Facility: CLINIC | Age: 44
End: 2024-06-21

## 2024-07-02 ENCOUNTER — PATIENT OUTREACH (OUTPATIENT)
Dept: SURGERY | Facility: CLINIC | Age: 44
End: 2024-07-02

## 2024-07-12 ENCOUNTER — PATIENT OUTREACH (OUTPATIENT)
Dept: SURGERY | Facility: CLINIC | Age: 44
End: 2024-07-12

## 2024-07-22 ENCOUNTER — PATIENT OUTREACH (OUTPATIENT)
Dept: SURGERY | Facility: CLINIC | Age: 44
End: 2024-07-22

## 2024-07-25 ENCOUNTER — PATIENT OUTREACH (OUTPATIENT)
Dept: SURGERY | Facility: CLINIC | Age: 44
End: 2024-07-25

## 2024-07-29 ENCOUNTER — PATIENT OUTREACH (OUTPATIENT)
Dept: SURGERY | Facility: CLINIC | Age: 44
End: 2024-07-29

## 2024-08-02 ENCOUNTER — TELEPHONE (OUTPATIENT)
Dept: PSYCHIATRY | Facility: CLINIC | Age: 44
End: 2024-08-02

## 2024-08-02 NOTE — TELEPHONE ENCOUNTER
Referral came to FD via Fax from [facility] on [address] in [place]. Patient wants to transfer care to our clinic. Please advise.     Referral has been  loaded into Media

## 2024-08-14 ENCOUNTER — PATIENT OUTREACH (OUTPATIENT)
Dept: SURGERY | Facility: CLINIC | Age: 44
End: 2024-08-14

## 2024-08-19 ENCOUNTER — PATIENT OUTREACH (OUTPATIENT)
Dept: SURGERY | Facility: CLINIC | Age: 44
End: 2024-08-19

## 2024-08-21 ENCOUNTER — PATIENT OUTREACH (OUTPATIENT)
Dept: SURGERY | Facility: CLINIC | Age: 44
End: 2024-08-21

## 2024-09-03 ENCOUNTER — PATIENT OUTREACH (OUTPATIENT)
Dept: SURGERY | Facility: CLINIC | Age: 44
End: 2024-09-03
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
## 2024-09-10 ENCOUNTER — PATIENT OUTREACH (OUTPATIENT)
Dept: SURGERY | Facility: CLINIC | Age: 44
End: 2024-09-10

## 2024-09-13 ENCOUNTER — PATIENT OUTREACH (OUTPATIENT)
Dept: SURGERY | Facility: CLINIC | Age: 44
End: 2024-09-13

## 2024-10-23 ENCOUNTER — TELEPHONE (OUTPATIENT)
Dept: BEHAVIORAL/MENTAL HEALTH CLINIC | Facility: CLINIC | Age: 44
End: 2024-10-23

## 2024-10-23 NOTE — TELEPHONE ENCOUNTER
One week follow up call for New Patient appointment with   ROSIE Peace   on 10/24/2024 was made on 10/23/2024. Paper work was not completed but advised patient to come into the office early to complete NP PW, No active Mychart and not enough time to mail PW.       Appointment was made on: 10/16/2024

## 2024-10-24 ENCOUNTER — OFFICE VISIT (OUTPATIENT)
Dept: BEHAVIORAL/MENTAL HEALTH CLINIC | Facility: CLINIC | Age: 44
End: 2024-10-24

## 2024-10-24 DIAGNOSIS — F41.1 GENERALIZED ANXIETY DISORDER: ICD-10-CM

## 2024-10-24 DIAGNOSIS — F33.3 MAJOR DEPRESSIVE DISORDER, RECURRENT, SEVERE WITH PSYCHOTIC FEATURES (HCC): Primary | ICD-10-CM

## 2024-10-24 PROCEDURE — 90791 PSYCH DIAGNOSTIC EVALUATION: CPT | Performed by: COUNSELOR

## 2024-10-24 NOTE — PSYCH
Behavioral Health Psychotherapy Assessment    Date of Initial Psychotherapy Assessment: 10/25/24  Referral Source: self   Has a release of information been signed for the referral source? Yes    Preferred Name: Edward Wong  Preferred Pronouns: He/him  YOB: 1980 Age: 44 y.o.  Sex assigned at birth: male   Gender Identity: male  Race:   Preferred Language: Greek    Emergency Contact:  Full Name: Sonia Mahoney  Relationship to Client: mother  Contact information: [phone]    Primary Care Physician:  HALI Diaz  [address]  [facility] [address]  [phone]  Has a release of information been signed? Yes    Physical Health History:  Past surgical procedures: right shouder  Do you have a history of any of the following:   Do you have any mobility issues? No    Relevant Family History:    Edward resides in [place]. Edward is disabled. He is single. Edward mom is alive, dad passed away on 1991. 3 siblings (one twin). He is puert[place], moved to [place] on [place]. Edward gets along with mom, cut off from 3 siblings, including his twin. There is mental health issues within his family including depression, schizophrenia, PTSD, bipolar. There hx. Of substance abuse within family system.  Treatment Plan Tracking    # 1Treatment Plan not completed within required time limits due to: Not Done within 30 days of initial visit due to intensive intake, entire session was needed to gather all relevant information. .     Presenting Problem (What brings you in?)    Edward came to our office for continuation of care. Edward is informed of the purpose of this Biopsychosocial assessment, HIIPA, attendance policy and the limits of confidentiality.Edward struggles managing his maladaptive behaviors and moods. Edward reported his depression and anxiety symptoms started at early age in [place]. Hx. Of panic attacks since age 34 after receiving a diagnoses of HIV. Panic attacks  increased as he was never treated or medicated. Noted he was under psychotropic medications for sleep aid, but anxiety. [facility]. He then decided relocate to [place] on 2016 seeking out better career opportunities and follow up on his psychiatric care. Edward then moved to [place] having difficulty adjusting due environmental stressors including his health condition, his inability to maintain jobs. Edward worries a lot on his health, family cut offs. Edward admits experiencing racing thoughts, elevated anxiety, low mood, paranoia and auditive hallucinations There is unresolved grief after losing his dad at age 11. Edward feels more stable with current psychiatric medications. Edward completed 2 masters in human resources and management in [place]. Edward takes care of his mom's health, checking on her and transporting her to appointments.. He plays with his dog, likes shopping to cope with emotions. Referral completed for psychiatric MM regimen.    Mental Health Advance Directive:  Do you currently have a Mental Health Advance Directive?yes    Diagnosis:   Diagnosis ICD-10-CM Associated Orders   1. Major depressive disorder, recurrent, severe with psychotic features (HCC)  F33.3       2. Generalized anxiety disorder  F41.1           Initial Assessment:     Current Mental Status:    Appearance: appropriate, casual and neat      Behavior/Manner: cooperative and restless      Affect/Mood:  Anxious, fearful, sad, negative, blue and depressed    Speech:  Articulate, normal, rapid and pressured    Sleep:  Interrupted    Oriented to: oriented to self, oriented to place and oriented to time       Clinical Symptoms    Depression: yes      Anxiety: yes      Psychosis: yes      Depression Symptoms: depressed mood, restlessness, serious loss of interest in things, social isolation, fatigue and sleep disturbance      Anxiety Symptoms: excessive worry, fatigues easily, muscle tension, fear of losing control,  nervous/anxious, difficulty controlling worry and restlessness      Psychosis Symptoms: hallucinations      Were you under the influence of drugs or alcohol: No      Have you ever been assaultive to others or the environment: No      Have you ever been self-injurious: No      Counseling History:  Previous Counseling or Treatment  (Mental Health or Drug & Alcohol): No    Have you previously taken psychiatric medications: No      Suicide Risk Assessment  Have you ever had a suicide attempt: Yes    Have you had incidents of suicidal ideation: Yes    Are you currently experiencing suicidal thoughts: No    Additional Suicide Risk Information:  Suicide attempt 1 year 1/2, took pills, went to [facility]    Substance Abuse/Addiction Assessment:  Alcohol: No    Heroin: No    Fentanyl: No    Opiates: No    Cocaine: No    Amphetamines: No    Hallucinogens: No    Club Drugs: No    Benzodiazepines: No    Other Rx Meds: No    Marijuana: No    Tobacco/Nicotine: No    Have you experienced blackouts as a result of substance use: No    Have you had any periods of abstinence: No    Have you experienced symptoms of withdrawal: No    Have you ever overdosed on any substances?: No    Are you currently using any Medication Assisted Treatment for Substance Use: No      Compulsive Behaviors:  Compulsive Behaviors:  Shopping    Disordered Eating History:  Do you have a history of disordered eating: No      Social Determinants of Health:    SDOH:  None, social isolation, stress and other    Trauma and Abuse History:    Have you ever been abused: No      Legal History:    Have you ever been arrested  or had a DUI: No      Have you been incarcerated: No      Are you currently on parole/probation: No      Any current Children and Youth involvement: No      Any pending legal charges: No      Relationship History:    Current marital status: single      Employment History    Are you currently employed: No      Currently seeking employment: No       Longest period of employment:  1 year    Future work goals:  Disabled    Sources of income/financial support:  Supplemental Security Income (SSI)     History:      Status: no history of  duty  Educational History:     Have you ever been diagnosed with a learning disability: No      Highest level of education:  Master's Degree    Have you ever had an IEP or 504-plan: No      Do you need assistance with reading or writing: No      Recommended Treatment:     Psychotherapy:  Individual sessions and medication management    Frequency:  2 times    Session frequency:  Monthly      Visit start and stop times:    10/25/24  Start Time: 0600  Stop Time: 0700  Total Visit Time: 60 minutes

## 2024-10-28 ENCOUNTER — PATIENT OUTREACH (OUTPATIENT)
Dept: SURGERY | Facility: CLINIC | Age: 44
End: 2024-10-28

## 2024-10-28 NOTE — PROGRESS NOTES
HC assisted in completing November check requisition and signed. HC sent to YESENIA Landaverde for processing and accounts payable.

## 2024-10-29 ENCOUNTER — TELEPHONE (OUTPATIENT)
Age: 44
End: 2024-10-29

## 2024-10-29 NOTE — TELEPHONE ENCOUNTER
Edward Wong and/or patient requested a call back to discuss appt scheduled on 11/12/24 at 10am. Writer used interpretation services. Patient thought that appt was scheduled for 2pm. Patient is requesting a call back from provider.    They can be reached at P# [phone].       Thank you.

## 2024-10-30 ENCOUNTER — DOCUMENTATION (OUTPATIENT)
Dept: BEHAVIORAL/MENTAL HEALTH CLINIC | Facility: CLINIC | Age: 44
End: 2024-10-30

## 2024-10-30 NOTE — PROGRESS NOTES
Patient called to check on Appoint. 11/12 at 10 am. Requested afternoon appoint. However clinician indicated there is not afternoon appoint. Open. He agreed keeping morning appoint. Virtual, requesting after 1pm for further appointms.

## 2024-10-31 ENCOUNTER — TELEMEDICINE (OUTPATIENT)
Dept: BEHAVIORAL/MENTAL HEALTH CLINIC | Facility: CLINIC | Age: 44
End: 2024-10-31
Payer: MEDICARE

## 2024-10-31 DIAGNOSIS — F41.1 GENERALIZED ANXIETY DISORDER: ICD-10-CM

## 2024-10-31 DIAGNOSIS — F33.3 MAJOR DEPRESSIVE DISORDER, RECURRENT, SEVERE WITH PSYCHOTIC FEATURES (HCC): Primary | ICD-10-CM

## 2024-10-31 PROCEDURE — 90832 PSYTX W PT 30 MINUTES: CPT | Performed by: COUNSELOR

## 2024-10-31 NOTE — PSYCH
Virtual Regular Visit  Name: Edward Wong      : 1980      MRN: [id number]  Encounter Provider: ROSIE ePace  Encounter Date: 10/31/2024   Encounter department: [facility] [address]    Verification of patient location:    Patient is located at Home in the following state in which I hold an active license [place]    Assessment & Plan  Major depressive disorder, recurrent, severe with psychotic features (HCC)         Generalized anxiety disorder             Encounter provider ROSIE Peace    The patient was identified by name and date of birth. Edward Wong was informed that this is a telemedicine visit and that the visit is being conducted through the Epic Embedded platform. He agrees to proceed..  My office door was closed. No one else was in the room.  He acknowledged consent and understanding of privacy and security of the video platform. The patient has agreed to participate and understands they can discontinue the visit at any time.    Patient is aware this is a billable service.     History of Present Illness     Edward Wong is a 44 y.o. male who presents anxious, inattention      Review of Systems        Objective     There were no vitals taken for this visit.  Physical Exam    Behavioral Health Psychotherapy Progress Note    Psychotherapy Provided: Individual Psychotherapy     1. Major depressive disorder, recurrent, severe with psychotic features (HCC)        2. Generalized anxiety disorder            Goals addressed in session: Goal 1     DATA:     Edward and therapist met for an individual therapy session. Therapy reached out to Edward as he inquired for a session if a ns happened. Edward was available to conduct this virtual meeting, appearing engaged, alert and responsive. Edward started session check in on recent precipitating events triggering episodes of anxiety and low mood. Started venting on his concerns about compulsive shopping.  "Edward showed tons of unused brand new clothes, shoes, perfumes and personal items bought. Reported he does buy on [employer] and other local stores, without even having the need for the item. He was able to explore some psychological motives behind this compulsive behaviors referring that \"he likes to look neat and impress others\". Edward vented worries on recent rent increased, accumulations of bills, including one from a local store almost on the 9,000 dollars on credit card. Edward admits he would like to work on exploring these maladaptive behaviors in depth, recognizing an issue on delaying gratification affecting his self confidence. Edward's mom will give him 100 dollars to help paying his credit card on monthly basis. Therapist and Charlee will continue assessing negative behaviors, encouraging recognition of compulsions and encouraging acceptance of responsibility for own behaviors.     During this session, this clinician used the following therapeutic modalities: Engagement Strategies, Client-centered Therapy, Cognitive Behavioral Therapy, Cognitive Processing Therapy, Solution-Focused Therapy, and Supportive Psychotherapy    Substance Abuse was not addressed during this session. If the client is diagnosed with a co-occurring substance use disorder, please indicate any changes in the frequency or amount of use: . Stage of change for addressing substance use diagnoses: No substance use/Not applicable    ASSESSMENT:  Edward Wong presents with a Euthymic/ normal, Anxious, and Depressed mood.     his affect is Normal range and intensity and Blunted, which is congruent, with his mood and the content of the session. The client has not made progress on their goals.     Edward Wong presents with a none risk of suicide, none risk of self-harm, and none risk of harm to others.    For any risk assessment that surpasses a \"low\" rating, a safety plan must be developed.    A safety plan was indicated: " no  If yes, describe in detail     PLAN: Between sessions, Edward Wong will continue building rapport and initial trust. At the next session, the therapist will use Engagement Strategies, Client-centered Therapy, Cognitive Behavioral Therapy, Cognitive Processing Therapy, Dialectical Behavior Therapy, Solution-Focused Therapy, and Supportive Psychotherapy to address stressors.    Behavioral Health Treatment Plan and Discharge Planning: Edward Wong is aware of and agrees to continue to work on their treatment plan. They have identified and are working toward their discharge goals. yes    Visit start and stop times:    10/31/24  Start Time: 0245  Stop Time: 0312  Total Visit Time: 27 minutes

## 2024-11-05 ENCOUNTER — TELEMEDICINE (OUTPATIENT)
Dept: BEHAVIORAL/MENTAL HEALTH CLINIC | Facility: CLINIC | Age: 44
End: 2024-11-05
Payer: MEDICARE

## 2024-11-05 DIAGNOSIS — F41.1 GENERALIZED ANXIETY DISORDER: ICD-10-CM

## 2024-11-05 DIAGNOSIS — F33.3 MAJOR DEPRESSIVE DISORDER, RECURRENT, SEVERE WITH PSYCHOTIC FEATURES (HCC): Primary | ICD-10-CM

## 2024-11-05 PROCEDURE — 90832 PSYTX W PT 30 MINUTES: CPT | Performed by: COUNSELOR

## 2024-11-05 NOTE — PSYCH
"Virtual Regular Visit  Name: Edward Wong      : 1980      MRN: [id number]  Encounter Provider: ROSIE Peace  Encounter Date: 2024   Encounter department: [facility] [address]    Verification of patient location:    Patient is located at Home in the following state in which I hold an active license [place]    Assessment & Plan  Major depressive disorder, recurrent, severe with psychotic features (HCC)         Generalized anxiety disorder             Encounter provider ROSIE Peace    The patient was identified by name and date of birth. Edward Wong was informed that this is a telemedicine visit and that the visit is being conducted through the Epic Embedded platform. He agrees to proceed..  My office door was closed. No one else was in the room.  He acknowledged consent and understanding of privacy and security of the video platform. The patient has agreed to participate and understands they can discontinue the visit at any time.    Patient is aware this is a billable service.     History of Present Illness     Edward Wong is a 44 y.o. male who presents anxiety      Review of Systems        Objective     There were no vitals taken for this visit.  Physical Exam    Behavioral Health Psychotherapy Progress Note    Psychotherapy Provided: Individual Psychotherapy     1. Major depressive disorder, recurrent, severe with psychotic features (HCC)        2. Generalized anxiety disorder          Goals addressed in session: Goal 1     DATA:     Met with Edward for his individual therapy session. Reported changes on moods and behaviors, referring to precipitating events leading to episodes of low motivation, frustrations and sadness. Reported recent mood fluctuations, indicating his frustrations for not following up on his daily self care routines. Edward discussed his lack of effort to commit with the gym, referring that \"he does not want to go when there " "there is people\" avoiding crowns places\". Edward also reported his frustrations on gaining weight, noted \"he is not longer cooking\" because of not having motivation as well. Edward and therapist engaged in assessing his mood fluctuations, challenging distorted thoughts and identifying areas of change, areas of life and self care in which Edward has control and those where he lacks control. Charlee asked to terminate earlier due his voting committment and need to transport his mom for voting as well.     During this session, this clinician used the following therapeutic modalities: Engagement Strategies, Cognitive Behavioral Therapy, Cognitive Processing Therapy, Dialectical Behavior Therapy, Solution-Focused Therapy, and Supportive Psychotherapy    Substance Abuse was not addressed during this session. If the client is diagnosed with a co-occurring substance use disorder, please indicate any changes in the frequency or amount of use: . Stage of change for addressing substance use diagnoses: No substance use/Not applicable    ASSESSMENT:  Edward Wong presents with a Euthymic/ normal, Anxious, and Dysthymic mood.     his affect is Normal range and intensity, Blunted, and Bright, which is congruent, with his mood and the content of the session. The client has not made progress on their goals.     Edward Wong presents with a none risk of suicide, none risk of self-harm, and none risk of harm to others.    For any risk assessment that surpasses a \"low\" rating, a safety plan must be developed.    A safety plan was indicated: no  If yes, describe in detail     PLAN: Between sessions, Edward Wong will prioritize self care by attending at least 1x to the gym this week. At the next session, the therapist will use Engagement Strategies, Client-centered Therapy, Cognitive Behavioral Therapy, Cognitive Processing Therapy, Solution-Focused Therapy, and Supportive Psychotherapy to address " stressors.    Behavioral Health Treatment Plan and Discharge Planning: Edward Chapmananez is aware of and agrees to continue to work on their treatment plan. They have identified and are working toward their discharge goals. yes    Visit start and stop times:    11/05/24  Start Time: 0210  Stop Time: 0230  Total Visit Time: 20 minutes

## 2024-11-11 ENCOUNTER — TELEPHONE (OUTPATIENT)
Dept: PSYCHIATRY | Facility: CLINIC | Age: 44
End: 2024-11-11

## 2024-11-11 NOTE — TELEPHONE ENCOUNTER
Pt requesting to be placed on psych med management waiting list. If pt is on waiting list pt is requesting an update on when he may be scheduled.     Pt needs interpretor or [place] speaking provider     Please advise.

## 2024-11-12 ENCOUNTER — TELEPHONE (OUTPATIENT)
Dept: OTHER | Age: 44
End: 2024-11-12

## 2024-11-12 NOTE — TELEPHONE ENCOUNTER
Contacted pt to cancel appt with Ninfa 11/12 at 10am . Due to provider OOO.     Pt understood.    Call completed

## 2024-11-13 ENCOUNTER — TELEMEDICINE (OUTPATIENT)
Dept: BEHAVIORAL/MENTAL HEALTH CLINIC | Facility: CLINIC | Age: 44
End: 2024-11-13
Payer: MEDICARE

## 2024-11-13 DIAGNOSIS — F33.3 MAJOR DEPRESSIVE DISORDER, RECURRENT, SEVERE WITH PSYCHOTIC FEATURES (HCC): Primary | ICD-10-CM

## 2024-11-13 DIAGNOSIS — F41.1 GENERALIZED ANXIETY DISORDER: ICD-10-CM

## 2024-11-13 PROCEDURE — 90837 PSYTX W PT 60 MINUTES: CPT | Performed by: COUNSELOR

## 2024-11-13 NOTE — PSYCH
Virtual Regular Visit  Name: Edward Wong      : 1980      MRN: [id number]  Encounter Provider: ROSIE Peace  Encounter Date: 2024   Encounter department: [facility] [address]    Verification of patient location:    Patient is located at Home in the following state in which I hold an active license [place]    Assessment & Plan  Major depressive disorder, recurrent, severe with psychotic features (HCC)         Generalized anxiety disorder             Encounter provider ROSIE Peace    The patient was identified by name and date of birth. Edward Wong was informed that this is a telemedicine visit and that the visit is being conducted through the Epic Embedded platform. He agrees to proceed..  My office door was closed. No one else was in the room.  He acknowledged consent and understanding of privacy and security of the video platform. The patient has agreed to participate and understands they can discontinue the visit at any time.    Patient is aware this is a billable service.     History of Present Illness     Edward Wong is a 44 y.o. male who presents with low mood.     History obtained from : patient  Review of Systems        Objective     There were no vitals taken for this visit.  Physical Exam    Behavioral Health Psychotherapy Progress Note    Psychotherapy Provided: Individual Psychotherapy     1. Major depressive disorder, recurrent, severe with psychotic features (HCC)        2. Generalized anxiety disorder            Goals addressed in session: Goal 1     DATA:     Started session with initial check in. Edward reported difficulties falling sleep. Reported just waking up, describing his bed time routine including watching late night [place] news. After tv Edward reports taking her medications, falling sleep around 1 am. Continue reviewing moods, behaviors and feelings for this week, indicating not major changes. Described racing  thoughts, elevated anxiety and frustrations reg. Environmental stressors. Share his beliefs schemas on how other people perceive those people on disability, due his tendencies to over spend on clothes and self care products.  Share taking anti aging natural pills bought on [employer]. Clinician and Edward discussed sources of compulsive behaviors and the beliefs behind shopping under stress. Discuss how shopping can be a distraction from high anxiety, causing financial stress and increased frustrations due poor budgeting.Clinician allow processing while facilitate increased awareness of behavioral responses, identifying ineffective and inappropriate attempts to get her needs met.  Reported interpersonal issues with family members, choosing to cut off with them but mom. Discuss the impact of those stressors on his mental health. Reinforce utilize resources, practicing appropriate verbal and behavioral responses for a variety of anticipated situations. Edward inquired an attendance letter for last month for his own record. To be provided at next session. He agreed and understood.      During this session, this clinician used the following therapeutic modalities: Engagement Strategies, Cognitive Behavioral Therapy, Cognitive Processing Therapy, Dialectical Behavior Therapy, Solution-Focused Therapy, and Supportive Psychotherapy    Substance Abuse was addressed during this session. If the client is diagnosed with a co-occurring substance use disorder, please indicate any changes in the frequency or amount of use: . Stage of change for addressing substance use diagnoses: No substance use/Not applicable    ASSESSMENT:  Edward oWng presents with a Euthymic/ normal, Anxious, and Depressed mood.     his affect is Normal range and intensity, Blunted, and Constricted, which is congruent, with his mood and the content of the session. The client has made progress on their goals.     Edward Wong presents with a none risk  "of suicide, none risk of self-harm, and none risk of harm to others.    For any risk assessment that surpasses a \"low\" rating, a safety plan must be developed.    A safety plan was indicated: yes  If yes, describe in detail     PLAN: Between sessions, Edward Wong will utilize resources, practicing appropriate verbal and behavioral responses for a variety of anticipated situations. . At the next session, the therapist will use Engagement Strategies, Cognitive Behavioral Therapy, Cognitive Processing Therapy, Dialectical Behavior Therapy, Solution-Focused Therapy, and Supportive Psychotherapy to address stressors.    Behavioral Health Treatment Plan and Discharge Planning: Edward Wong is aware of and agrees to continue to work on their treatment plan. They have identified and are working toward their discharge goals. yes    Visit start and stop times:    11/13/24  Start Time: 0107  Stop Time: 0200  Total Visit Time: 53 minutes        "

## 2024-11-21 ENCOUNTER — TELEPHONE (OUTPATIENT)
Dept: PSYCHIATRY | Facility: CLINIC | Age: 44
End: 2024-11-21

## 2024-11-21 ENCOUNTER — TELEMEDICINE (OUTPATIENT)
Dept: BEHAVIORAL/MENTAL HEALTH CLINIC | Facility: CLINIC | Age: 44
End: 2024-11-21
Payer: MEDICARE

## 2024-11-21 DIAGNOSIS — F41.1 GENERALIZED ANXIETY DISORDER: ICD-10-CM

## 2024-11-21 DIAGNOSIS — F33.3 MAJOR DEPRESSIVE DISORDER, RECURRENT, SEVERE WITH PSYCHOTIC FEATURES (HCC): Primary | ICD-10-CM

## 2024-11-21 PROCEDURE — 90834 PSYTX W PT 45 MINUTES: CPT | Performed by: COUNSELOR

## 2024-11-21 NOTE — PSYCH
Virtual Regular Visit  Name: Edward Wong      : 1980      MRN: [id number]  Encounter Provider: ROSIE Peace  Encounter Date: 2024   Encounter department: [facility] [address]      Verification of patient location:  Patient is located at Home in the following state in which I hold an active license [place] :  Assessment & Plan  Major depressive disorder, recurrent, severe with psychotic features (HCC)         Generalized anxiety disorder             Encounter provider ROSIE Peace    The patient was identified by name and date of birth. Edward Wong was informed that this is a telemedicine visit and that the visit is being conducted through the Epic Embedded platform. He agrees to proceed..  My office door was closed. No one else was in the room.  He acknowledged consent and understanding of privacy and security of the video platform. The patient has agreed to participate and understands they can discontinue the visit at any time.    Patient is aware this is a billable service.     History of Present Illness     HPI  Review of Systems    Objective   There were no vitals taken for this visit.    Physical Exam    Behavioral Health Psychotherapy Progress Note    Psychotherapy Provided: Individual Psychotherapy     1. Major depressive disorder, recurrent, severe with psychotic features (HCC)        2. Generalized anxiety disorder            Goals addressed in session: Goal 1     DATA:     Edward and therapist met to continue reviewing moods, behaviors and feelings. Edward reported no major changes on feelings or thought this week. He is being engaging in in person store shopping, petting his dog, spending time with mom and attending their doctor appointments to cope with uncomfortable emotions. Edward denied any recent panic attack or increased depression. Edward denied current SI, HI, SIB or psychosis.       During this session, this clinician used the  "following therapeutic modalities: Cognitive Processing Therapy, Dialectical Behavior Therapy, Solution-Focused Therapy, and Supportive Psychotherapy    Substance Abuse was not addressed during this session. If the client is diagnosed with a co-occurring substance use disorder, please indicate any changes in the frequency or amount of use: . Stage of change for addressing substance use diagnoses: No substance use/Not applicable    ASSESSMENT:  Edward Wong presents with a Euthymic/ normal, Anxious, Dysthymic, and Depressed mood.     his affect is Normal range and intensity, which is congruent, with his mood and the content of the session. The client has not made progress on their goals.     Edward Wong presents with a none risk of suicide, none risk of self-harm, and none risk of harm to others.    For any risk assessment that surpasses a \"low\" rating, a safety plan must be developed.    A safety plan was indicated: no  If yes, describe in detail     PLAN: Between sessions, Edward Wong will cope with mood changes listening to music to uplift mood. At the next session, the therapist will use Cognitive Behavioral Therapy, Cognitive Processing Therapy, Dialectical Behavior Therapy, Solution-Focused Therapy, and Supportive Psychotherapy to address stressors.    Behavioral Health Treatment Plan and Discharge Planning: Edward Wong is aware of and agrees to continue to work on their treatment plan. They have identified and are working toward their discharge goals. yes    Visit start and stop times:    11/21/24  Start Time: 0200  Stop Time: 0251  Total Visit Time: 51 minutes          "

## 2024-11-21 NOTE — TELEPHONE ENCOUNTER
Spoke to patient and/or parent/guardian to make aware of the Psych Encounter form needing to be signed from recent completed appointment(s).     Patient agreed to verbal consent for appointment on 10/24/2024 with Ninfa Beaver.

## 2024-12-09 ENCOUNTER — OFFICE VISIT (OUTPATIENT)
Dept: PSYCHIATRY | Facility: CLINIC | Age: 44
End: 2024-12-09
Payer: MEDICARE

## 2024-12-09 DIAGNOSIS — F11.20 OPIOID DEPENDENCE, UNCOMPLICATED (HCC): ICD-10-CM

## 2024-12-09 DIAGNOSIS — F43.10 PTSD (POST-TRAUMATIC STRESS DISORDER): ICD-10-CM

## 2024-12-09 DIAGNOSIS — G47.00 INSOMNIA, UNSPECIFIED TYPE: ICD-10-CM

## 2024-12-09 DIAGNOSIS — F41.1 GENERALIZED ANXIETY DISORDER WITH PANIC ATTACKS: ICD-10-CM

## 2024-12-09 DIAGNOSIS — F41.0 GENERALIZED ANXIETY DISORDER WITH PANIC ATTACKS: ICD-10-CM

## 2024-12-09 DIAGNOSIS — F31.4 BIPOLAR 1 DISORDER, DEPRESSED, SEVERE (HCC): Primary | ICD-10-CM

## 2024-12-09 PROBLEM — R85.81 ANAL HIGH RISK HPV DNA TEST POSITIVE: Status: ACTIVE | Noted: 2022-06-15

## 2024-12-09 PROBLEM — J45.20 MILD INTERMITTENT ASTHMA WITHOUT COMPLICATION: Status: ACTIVE | Noted: 2021-12-15

## 2024-12-09 PROBLEM — L85.3 DRY SKIN DERMATITIS: Status: ACTIVE | Noted: 2022-02-04

## 2024-12-09 PROCEDURE — 90792 PSYCH DIAG EVAL W/MED SRVCS: CPT | Performed by: PSYCHIATRY & NEUROLOGY

## 2024-12-09 RX ORDER — BUSPIRONE HYDROCHLORIDE 15 MG/1
15 TABLET ORAL 3 TIMES DAILY
COMMUNITY
End: 2024-12-09 | Stop reason: SDUPTHER

## 2024-12-09 RX ORDER — PREGABALIN 100 MG/1
100 CAPSULE ORAL 2 TIMES DAILY
COMMUNITY
Start: 2024-11-11

## 2024-12-09 RX ORDER — PSYLLIUM 3.4 G/7G
POWDER, FOR SOLUTION ORAL
COMMUNITY
Start: 2024-11-14

## 2024-12-09 RX ORDER — CABOTEGRAVIR AND RILPIVIRINE 600-900/3
KIT INTRAMUSCULAR
COMMUNITY
Start: 2024-10-21

## 2024-12-09 RX ORDER — LORAZEPAM 0.5 MG/1
0.5 TABLET ORAL 2 TIMES DAILY
Qty: 60 TABLET | Refills: 0 | Status: SHIPPED | OUTPATIENT
Start: 2025-01-03 | End: 2025-02-02

## 2024-12-09 RX ORDER — BENZTROPINE MESYLATE 2 MG/1
2 TABLET ORAL 2 TIMES DAILY
Qty: 60 TABLET | Refills: 2 | Status: SHIPPED | OUTPATIENT
Start: 2024-12-09

## 2024-12-09 RX ORDER — RISPERIDONE 3 MG/1
3 TABLET ORAL
COMMUNITY
Start: 2024-12-05 | End: 2024-12-09 | Stop reason: SDUPTHER

## 2024-12-09 RX ORDER — LORAZEPAM 0.5 MG/1
0.5 TABLET ORAL 2 TIMES DAILY
COMMUNITY
Start: 2024-09-13 | End: 2024-12-09 | Stop reason: SDUPTHER

## 2024-12-09 RX ORDER — HYDROQUINONE 4% 4 G/100G
1 EMULSION TOPICAL 2 TIMES DAILY
COMMUNITY
Start: 2024-08-12 | End: 2025-08-12

## 2024-12-09 RX ORDER — BUSPIRONE HYDROCHLORIDE 15 MG/1
15 TABLET ORAL 3 TIMES DAILY
Qty: 90 TABLET | Refills: 2 | Status: SHIPPED | OUTPATIENT
Start: 2024-12-09

## 2024-12-09 RX ORDER — ASCORBIC ACID 500 MG
1000 TABLET ORAL DAILY
COMMUNITY
Start: 2024-12-05

## 2024-12-09 RX ORDER — QUETIAPINE FUMARATE 400 MG/1
800 TABLET, FILM COATED ORAL
Qty: 60 TABLET | Refills: 2 | Status: SHIPPED | OUTPATIENT
Start: 2024-12-09

## 2024-12-09 RX ORDER — HYDROXYZINE PAMOATE 50 MG/1
50 CAPSULE ORAL 3 TIMES DAILY PRN
COMMUNITY
Start: 2024-09-13 | End: 2024-12-09 | Stop reason: SDUPTHER

## 2024-12-09 RX ORDER — BUPROPION HYDROCHLORIDE 200 MG/1
100 TABLET, EXTENDED RELEASE ORAL DAILY
COMMUNITY
End: 2024-12-09 | Stop reason: SDUPTHER

## 2024-12-09 RX ORDER — LORAZEPAM 2 MG/1
2 TABLET ORAL
COMMUNITY
Start: 2024-09-13 | End: 2024-12-09 | Stop reason: SDUPTHER

## 2024-12-09 RX ORDER — BUPROPION HYDROCHLORIDE 200 MG/1
200 TABLET, EXTENDED RELEASE ORAL DAILY
Qty: 30 TABLET | Refills: 2 | Status: SHIPPED | OUTPATIENT
Start: 2024-12-09

## 2024-12-09 RX ORDER — RISPERIDONE 3 MG/1
3 TABLET ORAL
Qty: 30 TABLET | Refills: 2 | Status: SHIPPED | OUTPATIENT
Start: 2024-12-09

## 2024-12-09 RX ORDER — BENZTROPINE MESYLATE 2 MG/1
2 TABLET ORAL 2 TIMES DAILY
COMMUNITY
End: 2024-12-09 | Stop reason: SDUPTHER

## 2024-12-09 RX ORDER — LORAZEPAM 2 MG/1
2 TABLET ORAL
Qty: 30 TABLET | Refills: 0 | Status: SHIPPED | OUTPATIENT
Start: 2025-01-03 | End: 2025-02-02

## 2024-12-09 RX ORDER — HYDROXYZINE PAMOATE 50 MG/1
50 CAPSULE ORAL
Qty: 90 CAPSULE | Refills: 2 | Status: SHIPPED | OUTPATIENT
Start: 2024-12-09

## 2024-12-09 RX ORDER — GABAPENTIN 800 MG/1
800 TABLET ORAL 4 TIMES DAILY
Qty: 120 TABLET | Refills: 2 | Status: SHIPPED | OUTPATIENT
Start: 2024-12-09

## 2024-12-09 RX ORDER — ATORVASTATIN CALCIUM 20 MG/1
20 TABLET, FILM COATED ORAL DAILY
COMMUNITY
Start: 2024-11-14 | End: 2025-11-14

## 2024-12-09 NOTE — ASSESSMENT & PLAN NOTE
DELONTE-7 of 19.  Continue Ativan 0.5 mg twice daily/Ativan 2 mg nightly  Continue BuSpar 15 mg 3 times daily  Continue Hydroxyzine pamoate 150 mg nightly  Continue Wellbutrin SR, Risperdal, Seroquel, Gabapentin as above  Lyrica per pain medicine  Continue psychotherapy with [facility] therapist, Ninfa Beaver

## 2024-12-09 NOTE — PSYCH
PSYCHIATRIC EVALUATION     [facility]    Name and Date of Birth:  Edward Wong 44 y.o. 1980 MRN: [id number]    Date of Visit: December 9, 2024  Start Time: 1:35 pm  End Time: 2:50 pm  Total Time: 75 min    Reason for visit:   Chief Complaint   Patient presents with    \[facility]\"" Care    Depression    Anxiety    PTSD         Assessment   ASSESSMENT/PLAN:     Edward is a 44 y.o. male with a PPHx of Bipolar Disorder, Generalized Anxiety Disorder, PTSD, insomnia and a PMH of HIV on HAART, syphilis (treated), asthma, HLD, gastritis, GERD  who presents for psychiatric evaluation for psychiatric medication management. Concern for polypharmacy with patient's regimen at [facility]. Patient repeatedly states that he feels much better than he did before the regimen became what it is today. May consider minor de-prescribing in the future, possibly Buspar, Wellbutrin. Reporting ongoing chronic AH with commands to harm himself that are quiet and easy to ignore. He does not find these hallucinations disturbing at this time. Patient did not appear psychotic on exam. Continues to take medications, adhere to treatment. Hospitalization not considered necessary at this time. Likely at baseline, per patient.  RTC in one month.    Assessment & Plan  Bipolar 1 disorder, depressed, severe (HCC)  PHQ-9 of 23.  Continue Risperdal 3 mg nightly  Continue Seroquel 800 mg nightly   - Currently on dual antipsychotic therapy due to failure with monotherapy; benefit of being on dual antipsychotics outweighs the risks. Has been tolerating this regimen well.    - , total cholesterol 236; on statin; need updated A1c, patient to get with PCP  Continue Cogentin 2 mg twice daily for EPS  Continue Wellbutrin  mg daily   - CYP 2D6 inhibition may lead to increased dose of Risperdal, patient understands risks and would like to continue previous regimen  Continue Gabapentin 800 mg 4 times  daily  Continue psychotherapy with [facility] therapist, Ninfa Beaver  Generalized anxiety disorder with panic attacks  DELONTE-7 of 19.  Continue Ativan 0.5 mg twice daily/Ativan 2 mg nightly  Continue BuSpar 15 mg 3 times daily  Continue Hydroxyzine pamoate 150 mg nightly  Continue Wellbutrin SR, Risperdal, Seroquel, Gabapentin as above  Lyrica per pain medicine  Continue psychotherapy with [facility] therapistNinfa  PTSD (post-traumatic stress disorder)  PCL-5 of 69.  Not currently on SSRI/SNRI, will avoid for now due to polypharmacy  Continue psychotherapy with [facility] therapist, Ninfa Beaver  Insomnia, unspecified type  Continue Seroquel, Hydroxyzine pamoate, Ativan, Risperdal, gabapentin as above  Opioid dependence, uncomplicated (HCC)  Ordered nalaxone given co-administration of multiple sedating medications with tramadol      Medications Risks/Benefits:      Risks, Benefits And Possible Side Effects Of Medications:    Risks, benefits, and possible side effects of medications explained to Edward and he verbalizes understanding and agreement for treatment.    Controlled Medication Discussion:     Edward has been filling controlled prescriptions on time as prescribed according to [place] Prescription Drug Monitoring Program    Suicide/Homicide Risk Assessment:  Risk of Harm to Self:  The following ratings are based on assessment at the time of the interview  Demographic risk factors include: never , male  Historical Risk Factors include: chronic depressive symptoms, chronic anxiety symptoms, history of psychosis, history of suicide attempts, history of traumatic experiences  Recent Specific Risk Factors include: current depressive symptoms, current anxiety symptoms, passive death wishes, hallucinations are command in nature, hopelessness, chronic health problems, lack of support  Protective Factors: no current suicidal ideation, access to mental health treatment, compliant with mental health treatment,  having a desire to live, stable living environment  Weapons: none. The following steps have been taken to ensure weapons are properly secured: not applicable  Based on today's assessment, Edward presents the following risk of harm to self: low acutely, moderate chronically    Risk of Harm to Others:  The following ratings are based on assessment at the time of the interview  Recent Specific Risk Factors include: none.  Protective Factors: no current homicidal ideation  Based on today's assessment, Edward presents the following risk of harm to others: none    The following interventions are recommended: continue medication management, continue psychotherapy    Subjective   HPI   Edward is a 44 y.o. male with a PPHx of Bipolar Disorder, Generalized Anxiety Disorder, PTSD, insomnia and a PMH of HIV on HAART, syphilis (treated), asthma, HLD, gastritis, GERD  who presents for psychiatric evaluation for psychiatric medication management.    He has a history of bipolar disorder. He was diagnosed in 2012, the same year with HIV. He had struggled with anxiety, panic attacks, depression. He was in AIDS at that time. He states he had to frequently go to the emergency room for anxiety as well as complications from HIV.     He endorses recent symptoms of depression including depressed mood, anhedonia, sleep changes (decreased chronically), appetite changes (low), decreased energy, feelings of hopelessness/worthlessness/excessive guilt, impaired concentration, and psychomotor agitation. He reports recent passive SI without plan or intent. Has history of suicide attempts. Last suicide attempt was in 2020. Has been hospitalized for mental health several times. Has poor social support - not in contact with his siblings.    Patient reports excessive worrying that is difficult to control ongoing for more than 6 months. Usual stressors include issues with his mother. He is not in contact with his siblings.  Also reports associated  symptoms of trouble concentrating due to worrying, trouble sleeping due to worrying, easy fatigue, restlessness, irritability, and muscle tension.  He has history of panic attacks. Last panic attack was 3 months ago. He states he has them usually once a month. They were previously very frequent.    He continues to hear voices that tell him to kill himself, negative commentary about himself. The voices are quiet and he is not bothered by them currently. He also experiences mild paranoia.    His father  from an overdose when he was 11. He had to identify the body with his mother. At his , his organs were at his feet and he smelled it. The smell took 5 years to get out of his mind. Mom suffered from schizophrenia. Finding out about HIV diagnosis was also traumatic. Endorses current symptoms of trauma. Reports intrusions including nightmares, flashbacks, intrusive memories, emotional/physical distress after reminders of the trauma. Reports avoidance of trauma related thoughts/feelings and trauma related reminders (people, places, situations). Endorses negative cognitions related to the trauma including feeling trouble recalling certain events of the trauma, feeling overly negative about the world/people/self, exaggerated blame of self/others, negative affect, trouble experiencing positive affect, anhedonia, and feeling isolated. Also reports hyperarousal symptoms including impaired concentration, impaired sleep, risky/destructive behavior, irritability, hypervigilance, and increased startle response.    He is currently taking the following psychiatric meds:  Ativan 0.5 mg twice daily/Ativan 2 mg nightly  Cogentin 2 mg twice daily  BuSpar 15 mg 3 times daily  Gabapentin 800 mg 4 times daily  Hydroxyzine pamoate 150 mg nightly  Risperdal 3 mg nightly  Seroquel 800 mg nightly  Wellbutrin  mg daily    Psychiatric Review Of Systems:  Sleep changes: decreased  Appetite changes: decreased  Weight changes:  no  Energy/anergy: decreased  Interest/pleasure: decreased  Anxiety/panic: yes  Dana: past manic episodes  Guilty/hopeless: yes  Self injurious behavior/risky behavior: no  Suicidal ideation: yes, no plan  Homicidal ideation: no  Auditory hallucinations: yes, auditory hallucinations  Visual hallucinations: no  Other hallucinations: no  Delusional thinking: paranoid thoughts  Eating disorder history: no  Obsessive/compulsive symptoms: no    Review Of Systems:  Constitutional negative   ENT negative   Cardiovascular negative   Respiratory negative   Gastrointestinal negative   Genitourinary negative   Musculoskeletal negative   Integumentary negative   Neurological negative   Endocrine negative   Other Symptoms none, all other systems are negative     Screenings:  PHQ-2/9 Depression Screening    Little interest or pleasure in doing things: 3 - nearly every day  Feeling down, depressed, or hopeless: 3 - nearly every day  Trouble falling or staying asleep, or sleeping too much: 2 - more than half the days  Feeling tired or having little energy: 3 - nearly every day  Poor appetite or overeating: 3 - nearly every day  Feeling bad about yourself - or that you are a failure or have let yourself or your family down: 3 - nearly every day  Trouble concentrating on things, such as reading the newspaper or watching television: 2 - more than half the days  Moving or speaking so slowly that other people could have noticed. Or the opposite - being so fidgety or restless that you have been moving around a lot more than usual: 2 - more than half the days  Thoughts that you would be better off dead, or of hurting yourself in some way: 2 - more than half the days  PHQ-9 Score: 23  PHQ-9 Interpretation: Severe depression       DELONTE-7 Flowsheet Screening      Flowsheet Row Most Recent Value   Over the last two weeks, how often have you been bothered by the following problems?     Feeling nervous, anxious, or on edge 3   Not being able to  stop or control worrying 2   Worrying too much about different things 3   Trouble relaxing  2   Being so restless that it's hard to sit still 3   Becoming easily annoyed or irritable  3   Feeling afraid as if something awful might happen 3   DELONTE Score  19          PTSD Checklist (PCL-5)    Please indicate how much you have been bothered by that problem in the last month. (0 = not at all, 1 = a little bit, 2 = moderately, 3 = quite a bit, 4 = extremely)   Repeated, disturbing memories, and unwanted memories of the stressful experience? 4   Repeated, disturbing dreams of the stressful experience? 3   Suddenly acting or feeling as if a stressful experience were actually happening again (as if you were actually back there reliving it?) 3   Feeling very upset when something reminded you of the stressful experience?  3   Having strong physical reactions when something reminded you of the stressful experience  (e.g., heart pounding, trouble breathing, or sweating)? 3   Avoiding memories, thoughts, or feelings related to the stressful experience?  4   Avoiding external reminders of the stressful experience (for example, people, places, conversations, activities, objects, or situations)?  3   Trouble remembering important parts of the stressful experience?  3   Having strong negative beliefs about yourself, other people, or the world (for example having thoughts such as: I am bad, there is something seriously wrong with me, no one can be trusted, the world is completely dangerous)? 4   Blaming yourself or someone else for the stressful experience or what happened after it? 4   Having strong negative feelings such as fear, horror, anger, guilt or shame?  4   Loss of interest in activities that you used to enjoy?  4   Feeling distant or cut off from other people?  3   Trouble experiencing positive feelings (for example, being unable to feel happiness or have loving feelings for people close to you)  4   Irritable, angry  "outbursts, or acting aggressively? 4   Taking too many risks or doing things that could cause you harm? 3   Being \"superalert\" or watchful or on guard? 3   Feeling jumpy or easily startled?  3   Having difficulty concentrating? 4   Trouble falling asleep or staying asleep? 3   Score 69       Past Psychiatric History:   Past Inpatient Psychiatric Treatment:   Multiple past inpatient psychiatric admissions at [facility] (5-6), [facility] x1, [facility] x1  Past Outpatient Psychiatric Treatment:    Was in outpatient psychiatric treatment in the past with a psychiatrist - last psychiatrist was Dr. Capellan with [facility]  Past Suicide Attempts: yes, by overdose on medications - last attempt was   Past Violent Behavior: no, but can get verbally worked up  Past Psychiatric Medication Trials:   Antidepressants: Prozac, Lexapro, Celexa, Wellbutrin SR, Remeron, and Trazodone (dizziness, loss of balance))  Antipsychotics: Latuda, Risperdal, and Seroquel  Mood Stabilizers: Neurontin  Anxiolytics: Ativan, Klonopin, and Hydroxyzine  Hypnotics: Ambien  ADHD Medications:  none  Other:  none    Traumatic History:   Abuse:  exposure to his father's  body after an unexpected overdose, lived with mother while she was in active addiction and had schizophrenia, HIV diagnosis with significant life threatening medical complications.  Other Traumatic Events: none     Family Psychiatric History:   Family History   Problem Relation Age of Onset    Depression Mother     Anxiety disorder Mother     Schizoaffective Disorder  Mother     Depression Father     Colon cancer Father     Schizophrenia Sister     Drug abuse Brother     Drug abuse Brother     Depression Maternal Grandmother     Dementia Maternal Grandmother        Substance Use History:  Social History     Substance and Sexual Activity   Alcohol Use Never     Social History     Substance and Sexual Activity   Drug Use Not on file       Social History:  Social History "     Socioeconomic History    Marital status: Unknown     Spouse name: Not on file    Number of children: Not on file    Years of education: Not on file    Highest education level: Not on file   Occupational History    Not on file   Tobacco Use    Smoking status: Never    Smokeless tobacco: Never   Substance and Sexual Activity    Alcohol use: Never    Drug use: Not on file    Sexual activity: Not on file   Other Topics Concern    Not on file   Social History Narrative    Lives with: alone    Marital status: never     Children: none    Siblings: 3 - 46M ([place]), 45F ([place]), 44M ([place])    Parents: mom is alive, father is diseased from     Education: Masters - Human Resources,     Employment: previously worked for [employer] department), currently on disability    Ethnicity: [place], moved initially to [place] in 2016 and then to [place] in 2017, came alone at first, then sister came      Social Drivers of Health     Financial Resource Strain: Not on file   Food Insecurity: Not on file   Transportation Needs: Not on file   Physical Activity: Not on file   Stress: Not on file   Social Connections: Not on file   Intimate Partner Violence: Not on file   Housing Stability: Not on file       Past Medical History:  Past Medical History:   Diagnosis Date    Bilateral carpal tunnel syndrome 2/24/2017    Chronic gastritis without bleeding 2/15/2017    Neg CT Scan A+P and EGD in 3.2017 Neg colonoscopy 6.2017 Neg Gastric Emptying study in 4.2017  Last Assessment & Plan:  Better    GERD (gastroesophageal reflux disease)     HIV (human immunodeficiency virus infection) ([facility])     Human immunodeficiency virus I infection ([facility]) 1/6/2017    HIV (+) 2012, CD4 was 28 then, MSM transmission in [place] CD4 288 (11/2016)  HLA  neg 1.2017  cART 1- Started in 5.2012 w/ Atripla, 2- Changed to Stribild in 5.2016 due to elevated TG 3- Change to Genvoya in Summer 2017 to decrease TDF long term AE 4- Change to  Triumeq in 4.2018 to decrease TG (still high) and avoid Carlita for long term AE  Last Assessment & Plan:  He was well controlled on his med    Late latent syphilis 5/23/2019    Memory loss     Psychosis (HCC) 3/8/2019    PTSD (post-traumatic stress disorder) 1/5/2018    Severe recurrent major depressive disorder with psychotic symptoms (HCC) 11/2/2016    Last Assessment & Plan:  Restarting medications listed on his discharge paperwork from [facility]-Wellbutrin  mg daily, Risperdal 0.5 mg BID and Neurontin 300 mg TID.  Advised patient that I will be stopping Klonopin and Ambien given his ongoing marijuana use.  Reducing Ambien to 5 mg QHS with plan to discontinue at next appointment and tapering off Klonopin over the next month.  He pick    Trouble in sleeping 3/9/2019        Past Surgical History:   Procedure Laterality Date    GROIN EXPLORATION      MANDIBLE SURGERY      NOSE SURGERY       Allergies   Allergen Reactions    Shellfish Allergy - Food Allergy Anaphylaxis     Patient states allergic to shark meat.     Shark Cartilage     Shellfish-Derived Products - Food Allergy        History Review:  The following portions of the patient's history were reviewed and updated as appropriate: allergies, current medications, past family history, past medical history, past social history, past surgical history, and problem list.    Objective   Vital signs in last 24 hours:  There were no vitals filed for this visit.    Mental Status Evaluation:  Appearance age appropriate, casually dressed   Behavior cooperative, calm   Speech normal rate, normal volume, normal pitch   Mood depressed   Affect constricted   Thought Processes organized, goal directed   Associations intact associations   Thought Content no overt delusions   Perceptual Disturbances: no visual hallucinations, auditory hallucinations, able to ignore them, chronic   Abnormal Thoughts  Risk Potential Suicidal ideation - Yes, fleeting suicidal  thoughts  Homicidal ideation - None  Potential for aggression - No   Orientation oriented to person, place, time/date, and situation   Memory recent and remote memory grossly intact   Consciousness alert and awake   Attention Span Concentration Span attention span and concentration are age appropriate   Intellect appears to be of average intelligence   Insight intact   Judgment intact   Muscle Strength and  Gait normal muscle strength and normal muscle tone, normal gait and normal balance   Motor Activity no abnormal movements   Language no difficulty naming common objects, no difficulty repeating a phrase, no difficulty writing a sentence   Fund of Knowledge adequate knowledge of current events  adequate fund of knowledge regarding past history  adequate fund of knowledge regarding vocabulary      Laboratory Results: I have personally reviewed all pertinent laboratory/tests results  Recent Labs:   No visits with results within 1 Month(s) from this visit.   Latest known visit with results is:   No results found for any previous visit.       Administrative Statements   Treatment Plan:  Completed and signed during the session: Yes - with Edward    This note was completed in part utilizing Dragon dictation Software. Grammatical, translation, syntax errors, random word insertions, spelling mistakes, and incomplete sentences may be an occasional consequence of this system secondary to software limitations with voice recognition, ambient noise, and hardware issues. If you have any questions or concerns about the content, text, or information contained within the body of this dictation, please contact the provider for clarification.     This note was not shared with the patient due to reasonable likelihood of causing patient harm       Kaylyn Poe MD 12/09/24

## 2024-12-09 NOTE — ASSESSMENT & PLAN NOTE
PHQ-9 of 23.  Continue Risperdal 3 mg nightly  Continue Seroquel 800 mg nightly   - Currently on dual antipsychotic therapy due to failure with monotherapy; benefit of being on dual antipsychotics outweighs the risks. Has been tolerating this regimen well.    - , total cholesterol 236; on statin; need updated A1c, patient to get with PCP  Continue Cogentin 2 mg twice daily for EPS  Continue Wellbutrin  mg daily   - CYP 2D6 inhibition may lead to increased dose of Risperdal, patient understands risks and would like to continue previous regimen  Continue Gabapentin 800 mg 4 times daily  Continue psychotherapy with [facility] therapist, Ninfa Beaver

## 2024-12-10 ENCOUNTER — PATIENT OUTREACH (OUTPATIENT)
Dept: SURGERY | Facility: CLINIC | Age: 44
End: 2024-12-10

## 2024-12-12 ENCOUNTER — PATIENT OUTREACH (OUTPATIENT)
Dept: SURGERY | Facility: CLINIC | Age: 44
End: 2024-12-12

## 2024-12-16 NOTE — ASSESSMENT & PLAN NOTE
PCL-5 of 69.  Not currently on SSRI/SNRI, will avoid for now due to polypharmacy  Continue psychotherapy with [facility] therapist, Ninfa Beaver

## 2024-12-16 NOTE — BH TREATMENT PLAN
TREATMENT PLAN (Medication Management Only)        [facility]    Name and Date of Birth:  Edward Wong 44 y.o. 1980  Date of Treatment Plan: December 16, 2024  Diagnosis/Diagnoses:    1. Bipolar 1 disorder, depressed, severe (HCC)    2. Generalized anxiety disorder with panic attacks    3. PTSD (post-traumatic stress disorder)    4. Insomnia, unspecified type    5. Opioid dependence, uncomplicated (HCC)      Strengths/Personal Resources for Self-Care: taking medications as prescribed, ability to communicate needs, ability to communicate well, ability to understand psychiatric illness, average or above intelligence, general fund of knowledge.  Area/Areas of need (in own words): anxiety, depression, mood swings  1. Long Term Goal: continue improvement in mood stability.  Target Date:6 months - 6/16/2025  Person/Persons responsible for completion of goal: Edward  2.  Short Term Objective (s) - How will we reach this goal?:   A. Provider new recommended medication/dosage changes and/or continue medication(s): continue current medications as prescribed.  B. Attend psychotherapy regularly.  C. N/A.  Target Date:6 months - 6/16/2025  Person/Persons Responsible for Completion of Goal: Edward  Progress Towards Goals: continuing treatment  Treatment Modality: medication management with psychotherapy every 1 month, continue psychotherapy with [facility] therapist  Review due 180 days from date of this plan: 6 months - 6/16/2025  Expected length of service: ongoing treatment  My Physician/PA/NP and I have developed this plan together and I agree to work on the goals and objectives. I understand the treatment goals that were developed for my treatment.

## 2024-12-17 ENCOUNTER — PATIENT OUTREACH (OUTPATIENT)
Dept: SURGERY | Facility: CLINIC | Age: 44
End: 2024-12-17

## 2024-12-19 ENCOUNTER — PATIENT OUTREACH (OUTPATIENT)
Dept: SURGERY | Facility: CLINIC | Age: 44
End: 2024-12-19

## 2024-12-19 ENCOUNTER — TELEMEDICINE (OUTPATIENT)
Dept: BEHAVIORAL/MENTAL HEALTH CLINIC | Facility: CLINIC | Age: 44
End: 2024-12-19
Payer: MEDICARE

## 2024-12-19 DIAGNOSIS — F41.1 GENERALIZED ANXIETY DISORDER: ICD-10-CM

## 2024-12-19 DIAGNOSIS — F33.3 MAJOR DEPRESSIVE DISORDER, RECURRENT, SEVERE WITH PSYCHOTIC FEATURES (HCC): Primary | ICD-10-CM

## 2024-12-19 PROCEDURE — 90837 PSYTX W PT 60 MINUTES: CPT | Performed by: COUNSELOR

## 2024-12-19 NOTE — PSYCH
Virtual Regular Visit  Name: Edward Wong      : 1980      MRN: [id number]  Encounter Provider: ROSIE Peace  Encounter Date: 2024   Encounter department: [facility] [address]      Verification of patient location:  Patient is located at Home in the following state in which I hold an active license [place] :  Assessment & Plan  Major depressive disorder, recurrent, severe with psychotic features (HCC)         Generalized anxiety disorder             Encounter provider ROSIE Peace    The patient was identified by name and date of birth. Edward Wong was informed that this is a telemedicine visit and that the visit is being conducted through the Epic Embedded platform. He agrees to proceed..  My office door was closed. No one else was in the room.  He acknowledged consent and understanding of privacy and security of the video platform. The patient has agreed to participate and understands they can discontinue the visit at any time.    Patient is aware this is a billable service.     History of Present Illness     HPI  Review of Systems    Objective   There were no vitals taken for this visit.    Physical Exam    Behavioral Health Psychotherapy Progress Note    Psychotherapy Provided: Individual Psychotherapy     1. Major depressive disorder, recurrent, severe with psychotic features (HCC)        2. Generalized anxiety disorder            Goals addressed in session: Goal 1     DATA:     Laura and therapist met to conduct an individual therapy session. Therapist showed genuine understanding and compassionate care empathizing his always willingness to collaborate.  Edward brought up emotional experiences affecting his mood this week including increased distress around dysfunctional family dynamics and caregiver stress. Reported worries on mom recent mom falls twice causing problems performing her daily activities. Edward shared frustrations on being the  "only one checking and taking care of mom, noting his oldest brother who resides with mom is not helping out.  Described communication conflicts with siblings, carrying anger due their intentional absent and not involvement in mom's care. Therapist supported and encouraged to process frustrations and mixed emotions around these events. Recognized feeling stressed out. Discuss appropriate ways to distract from stress, including prioritizing/creating daily moments of peace and solitude, distracting activities (take care/talk with plants and pets), and to make a good balance of other's and his problems to avoid burn out. Concetta left session willing to implement suggested self-care techniques to alleviate inner tension and improve mood.. We reviewed future appointments to ensure proper scheduling as per his needs.     During this session, this clinician used the following therapeutic modalities: Engagement Strategies, Client-centered Therapy, Cognitive Behavioral Therapy, Cognitive Processing Therapy, Solution-Focused Therapy, and Supportive Psychotherapy    Substance Abuse was not addressed during this session. If the client is diagnosed with a co-occurring substance use disorder, please indicate any changes in the frequency or amount of use: . Stage of change for addressing substance use diagnoses: No substance use/Not applicable    ASSESSMENT:  Edward Wong presents with a Euthymic/ normal, Anxious, Dysthymic, and Depressed mood.     his affect is Normal range and intensity, Blunted, Bright, and Flat, which is not congruent, with his mood and the content of the session. The client has not made progress on their goals.     Edward Wong presents with a none risk of suicide, none risk of self-harm, and none risk of harm to others.    For any risk assessment that surpasses a \"low\" rating, a safety plan must be developed.    A safety plan was indicated: no  If yes, describe in detail     PLAN: Between sessions, " Edward Judith will prioritize self care. At the next session, the therapist will use Engagement Strategies, Cognitive Behavioral Therapy, Cognitive Processing Therapy, Dialectical Behavior Therapy, Solution-Focused Therapy, and Supportive Psychotherapy to address stressors.    Behavioral Health Treatment Plan and Discharge Planning: Edward Wong is aware of and agrees to continue to work on their treatment plan. They have identified and are working toward their discharge goals. no    Depression Follow-up Plan Completed: Not applicable    Visit start and stop times:    12/19/24  Start Time: 0200  Stop Time: 0258  Total Visit Time: 58 minutes

## 2024-12-23 ENCOUNTER — PATIENT OUTREACH (OUTPATIENT)
Dept: SURGERY | Facility: CLINIC | Age: 44
End: 2024-12-23

## 2024-12-23 ENCOUNTER — TELEPHONE (OUTPATIENT)
Age: 44
End: 2024-12-23

## 2024-12-23 NOTE — TELEPHONE ENCOUNTER
Patient called office requesting to speak with therapist. No other information was given to writer. He can be reached at phone number on file. Prefers Haitian speaker but is okay with English

## 2025-01-03 ENCOUNTER — TELEPHONE (OUTPATIENT)
Dept: PSYCHIATRY | Facility: CLINIC | Age: 45
End: 2025-01-03

## 2025-01-03 ENCOUNTER — OFFICE VISIT (OUTPATIENT)
Dept: PSYCHIATRY | Facility: CLINIC | Age: 45
End: 2025-01-03
Payer: MEDICARE

## 2025-01-03 DIAGNOSIS — F41.0 GENERALIZED ANXIETY DISORDER WITH PANIC ATTACKS: ICD-10-CM

## 2025-01-03 DIAGNOSIS — F41.1 GENERALIZED ANXIETY DISORDER WITH PANIC ATTACKS: ICD-10-CM

## 2025-01-03 DIAGNOSIS — F43.10 PTSD (POST-TRAUMATIC STRESS DISORDER): ICD-10-CM

## 2025-01-03 DIAGNOSIS — F31.4 BIPOLAR 1 DISORDER, DEPRESSED, SEVERE (HCC): Primary | ICD-10-CM

## 2025-01-03 DIAGNOSIS — G47.00 INSOMNIA, UNSPECIFIED TYPE: ICD-10-CM

## 2025-01-03 PROCEDURE — 90833 PSYTX W PT W E/M 30 MIN: CPT | Performed by: PSYCHIATRY & NEUROLOGY

## 2025-01-03 PROCEDURE — 99214 OFFICE O/P EST MOD 30 MIN: CPT | Performed by: PSYCHIATRY & NEUROLOGY

## 2025-01-03 RX ORDER — LORAZEPAM 0.5 MG/1
0.5 TABLET ORAL 2 TIMES DAILY
Qty: 60 TABLET | Refills: 0 | Status: SHIPPED | OUTPATIENT
Start: 2025-02-02 | End: 2025-03-04

## 2025-01-03 RX ORDER — LORAZEPAM 2 MG/1
2 TABLET ORAL
Qty: 30 TABLET | Refills: 0 | Status: SHIPPED | OUTPATIENT
Start: 2025-02-02 | End: 2025-03-04

## 2025-01-03 NOTE — ASSESSMENT & PLAN NOTE
Continue Ativan 0.5 mg twice daily/Ativan 2 mg nightly  Continue BuSpar 15 mg 3 times daily  Continue Hydroxyzine pamoate 150 mg nightly  Continue Wellbutrin SR, Risperdal, Seroquel, Gabapentin as above  Lyrica per pain medicine  Continue psychotherapy with [facility] therapist, Ninfa Beaver

## 2025-01-03 NOTE — ASSESSMENT & PLAN NOTE
Not currently on SSRI/SNRI, will avoid for now due to polypharmacy  Continue psychotherapy with [facility] therapist, Ninfa Beaver
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
## 2025-01-03 NOTE — ASSESSMENT & PLAN NOTE
Continue Risperdal 3 mg nightly  Continue Seroquel 800 mg nightly               - Currently on dual antipsychotic therapy due to failure with monotherapy; benefit of being on dual antipsychotics outweighs the risks. Has been tolerating this regimen well.                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

## 2025-01-06 ENCOUNTER — PATIENT OUTREACH (OUTPATIENT)
Dept: SURGERY | Facility: CLINIC | Age: 45
End: 2025-01-06

## 2025-01-08 ENCOUNTER — PATIENT OUTREACH (OUTPATIENT)
Dept: SURGERY | Facility: CLINIC | Age: 45
End: 2025-01-08

## 2025-01-15 NOTE — PROGRESS NOTES
BIRGIT faxed to all   HC got TBRA check requisition signed  HC sent to [facility]  gave billing to Ana Maria Brooks

## 2025-01-16 ENCOUNTER — SOCIAL WORK (OUTPATIENT)
Dept: BEHAVIORAL/MENTAL HEALTH CLINIC | Facility: CLINIC | Age: 45
End: 2025-01-16
Payer: MEDICARE

## 2025-01-16 DIAGNOSIS — F31.4 BIPOLAR 1 DISORDER, DEPRESSED, SEVERE (HCC): ICD-10-CM

## 2025-01-16 DIAGNOSIS — F33.3 MAJOR DEPRESSIVE DISORDER, RECURRENT, SEVERE WITH PSYCHOTIC FEATURES (HCC): Primary | ICD-10-CM

## 2025-01-16 PROCEDURE — 90834 PSYTX W PT 45 MINUTES: CPT | Performed by: COUNSELOR

## 2025-01-16 NOTE — BH CRISIS PLAN
Client Name: Edward Wong       Client YOB: 1980    Jj Safety Plan      Creation Date: 1/16/25 Update Date: 1/16/25   Created By: ROSIE Peace Last Updated By: ROSIE Peace      Step 1: Warning Signs:   Warning Signs   panic attacks   isolation   excessive crying            Step 2: Internal Coping Strategies:   Internal Coping Strategies   shopping   working out            Step 3: People and social settings that provide distraction:   Name Contact Information   mom     [place]           Step 4: People whom I can ask for help during a crisis:      Name Contact Information    Julien Scott [phone]      Step 5: Professionals or agencies I can contact during a crisis:      Clinican/Agency Name Phone Emergency Contact    Ninfa Beaver MA, LPC        Local Emergency Department Emergency Department Phone Emergency Department Address    [facility]          Crisis Phone Numbers:   Suicide Prevention Lifeline: Call or Text  089 Crisis Text Line: Text HOME to 706-990   Please note: Some [facility] do not have a separate number for Child/Adolescent specific crisis. If your county is not listed under Child/Adolescent, please call the adult number for your county      Adult Crisis Numbers: Child/Adolescent Crisis Numbers   [facility]: [phone] [facility]: [phone]   [facility]: [phone] [facility]: [phone]   [facility]: [phone] [place]: [phone]   [facility]: [phone] [place]: [phone]   [facility]: [phone]   [facility]: [phone]   [facility]: [phone]   [facility]: [phone] (daytime) [phone] (after hours, weekends, holidays)      Step 6: Making the environment safer (plan for lethal means safety):      Optional: What is most important to me and worth living for?   My mom     ChandraJanes Safety Plan. Babs Francois  REINALDO Velazquez. Used with permission of the authors.

## 2025-01-16 NOTE — PSYCH
"Behavioral Health Psychotherapy Progress Note    Psychotherapy Provided: Individual Psychotherapy     1. Major depressive disorder, recurrent, severe with psychotic features (HCC)        2. Bipolar 1 disorder, depressed, severe (HCC)            Goals addressed in session: Goal 1     DATA:     Started session with initial check in. Charlee and therapist reviewed recent triggers and stressors impacting functioning. Reported feeling “upset” due family interpersonal conflicts negatively impacting mom's physical and mental health. Edward endorsed angered body responses throughout session referring to ongoing family communication issues within the family dynamic. Edward verbalizes being angry at oldest brother for \"instigating\" and causing his mom's health to decompensates. Therapist carefully listened, while helped Edward regaining self control after presenting with symptoms of irritability displaying rapid speech, escalated feelings and inadequate feelings. Therapist allowed to process source of frustrations while engage in some deep breathing techniques. Therapist maintained a calm tone while taught with cognitive re restructuring to discover, challenge and replace unrealistic thoughts process. Clinician prioritized cultural sensitivity by understanding Edward family dynamics, respecting his values and addressing potential stigmas.  Allow Edward to pause, taking a break, reconnect and breath to calm down nervous system. Therapist assisted processing, helping to replace negative thoughts for helpful ones.  Recommended increasing exercise, meaningful socialization, researching on meet up and facebook groups based on interest/hobbies to distract mind from negative stressors.     During this session, this clinician used the following therapeutic modalities: Client-centered Therapy, Cognitive Behavioral Therapy, Cognitive Processing Therapy, Dialectical Behavior Therapy, Solution-Focused Therapy, and Supportive " "Psychotherapy  Substance Abuse was not addressed during this session. If the client is diagnosed with a co-occurring substance use disorder, please indicate any changes in the frequency or amount of use: . Stage of change for addressing substance use diagnoses: No substance use/Not applicable    ASSESSMENT:  Edward Wong presents with a Euthymic/ normal, Angry, and Anxious mood.     his affect is Normal range and intensity, Blunted, and Bright, which is congruent, with his mood and the content of the session. The client has not made progress on their goals.     Edward Wong presents with a none risk of suicide, none risk of self-harm, and none risk of harm to others.    For any risk assessment that surpasses a \"low\" rating, a safety plan must be developed.    A safety plan was indicated: no  If yes, describe in detail     PLAN: Between sessions, Edward Wong will practice mindfulness. At the next session, the therapist will use Client-centered Therapy, Cognitive Behavioral Therapy, Cognitive Processing Therapy, Solution-Focused Therapy, and Supportive Psychotherapy to address stressors.    Behavioral Health Treatment Plan and Discharge Planning: Edward Wong is aware of and agrees to continue to work on their treatment plan. They have identified and are working toward their discharge goals. yes    Depression Follow-up Plan Completed: No    Visit start and stop times:    01/16/25      "

## 2025-01-16 NOTE — BH TREATMENT PLAN
Outpatient Behavioral Health Psychotherapy Treatment Plan    Edward Wong  1980     Date of Initial Psychotherapy Assessment: 10/24/24  /Date of Current Treatment Plan: 01/16/25  Treatment Plan Target Date: 07/16/25  Treatment Plan Expiration Date: TBD    Diagnosis:   1. Major depressive disorder, recurrent, severe with psychotic features (HCC)        2. Bipolar 1 disorder, depressed, severe (HCC)            Area(s) of Need: anxiety, depression, mood swings  Strengths:  taking medications as prescribed, ability to communicate needs, ability to communicate well, ability to understand psychiatric illness, average or above intelligence, general fund of knowledge. Close to maternal figure, resilient.     Long Term Goal 1 (in the client's own words): Stabilize depressive and anxiety symptoms learning effective coping skills    Stage of Change: Preparation    Target Date for completion: 07/16/25     Anticipated therapeutic modalities: mindfulness, calming strategies, nature walks, talk therapy, CBT, DBT, thought stopping, solution focused, somatic therapy, creative art expression, feeling expression, self monitoring, goal setting, stress management, conflict resolution, Self care modalities, Reframing, cognitive restructuring, assertive communication.      People identified to complete this goal: Edward and Clinician Ninfa Beaver MA, LPC      Objective 1: (identify the means of measuring success in meeting the objective): I will identify at least 3 new distress tolerance skills to manage every day stressors      Objective 2: (identify the means of measuring success in meeting the objective): I want to become more aware of my emotions and reduce depression           Objective 3:(identify the means of measuring success in meeting the objective): Provide psychoeducation on what is anxiety, causes and triggers.     I am currently under the care of a [facility] psychiatric provider: yes    My [facility]  "psychiatric provider is: MD Lui    I am currently taking psychiatric medications: Yes, as prescribed    I feel that I will be ready for discharge from mental health care when I reach the following (measurable goal/objective): \" indefinitely. I do not want to lose my benefits\"    For children and adults who have a legal guardian:   Has there been any change to custody orders and/or guardianship status? Yes. If yes, attach updated documentation.    I have created my Crisis Plan and have been offered a copy of this plan    Behavioral Health Treatment Plan [facility]: Diagnosis and Treatment Plan explained to Edward Wong acknowledges an understanding of their diagnosis. Edward Wong agrees to this treatment plan.    I have been offered a copy of this Treatment Plan. yes        "

## 2025-01-22 ENCOUNTER — PATIENT OUTREACH (OUTPATIENT)
Dept: SURGERY | Facility: CLINIC | Age: 45
End: 2025-01-22

## 2025-01-30 ENCOUNTER — PATIENT OUTREACH (OUTPATIENT)
Dept: SURGERY | Facility: CLINIC | Age: 45
End: 2025-01-30

## 2025-01-30 ENCOUNTER — SOCIAL WORK (OUTPATIENT)
Dept: BEHAVIORAL/MENTAL HEALTH CLINIC | Facility: CLINIC | Age: 45
End: 2025-01-30
Payer: MEDICARE

## 2025-01-30 DIAGNOSIS — F31.4 BIPOLAR 1 DISORDER, DEPRESSED, SEVERE (HCC): ICD-10-CM

## 2025-01-30 DIAGNOSIS — F33.3 MAJOR DEPRESSIVE DISORDER, RECURRENT, SEVERE WITH PSYCHOTIC FEATURES (HCC): Primary | ICD-10-CM

## 2025-01-30 DIAGNOSIS — F41.1 GENERALIZED ANXIETY DISORDER: ICD-10-CM

## 2025-01-30 PROCEDURE — 90837 PSYTX W PT 60 MINUTES: CPT | Performed by: COUNSELOR

## 2025-01-31 NOTE — PSYCH
Behavioral Health Psychotherapy Progress Note    Psychotherapy Provided: Individual Psychotherapy     1. Major depressive disorder, recurrent, severe with psychotic features (HCC)        2. Bipolar 1 disorder, depressed, severe (HCC)        3. Generalized anxiety disorder            Goals addressed in session: Goal 1 and Goal 2     DATA:     Edward attended his today individual therapy session.  Edward referred to his fears on losing medical and financial benefits due government changes on public politics. Edward endorsed depressed mood referring to his inability to sustain himself due his low income situation. Clinician actively listening while help normalizing intense emotions linked with environmental stressors affecting Edward functioning. Edward utilized narrative therapy to vent on memories where he used to work full time, have a more independent and financially stable life living in [place]. Clinician comfort and encouraged practicing breathing exercises, positive affirmations and grounding techniques to improve moods, behaviors and emotions. CBT and DBT aproaches to manage maladaptive responses and for self regulation.      During this session, this clinician used the following therapeutic modalities: Engagement Strategies, Client-centered Therapy, Cognitive Behavioral Therapy, Cognitive Processing Therapy, and Supportive Psychotherapy    Substance Abuse was not addressed during this session. If the client is diagnosed with a co-occurring substance use disorder, please indicate any changes in the frequency or amount of use: . Stage of change for addressing substance use diagnoses: No substance use/Not applicable    ASSESSMENT:  Edward Wong presents with a Euthymic/ normal, Angry, and Anxious mood.     his affect is Normal range and intensity, Bright, and Constricted, which is congruent, with his mood and the content of the session. The client has not made progress on their goals.      "Edward Wong presents with a none risk of suicide, none risk of self-harm, and none risk of harm to others.    For any risk assessment that surpasses a \"low\" rating, a safety plan must be developed.    A safety plan was indicated: no  If yes, describe in detail     PLAN: Between sessions, Edward Wong will engage in organizing his area to decrease depressed mood. At the next session, the therapist will use Engagement Strategies, Client-centered Therapy, Cognitive Behavioral Therapy, and Cognitive Processing Therapy to address stressors.    Behavioral Health Treatment Plan and Discharge Planning: Edward Wong is aware of and agrees to continue to work on their treatment plan. They have identified and are working toward their discharge goals. no    Depression Follow-up Plan Completed: No    Visit start and stop times:    01/30/25  Start Time: 0200  Stop Time: 0257  Total Visit Time: 57 minutes  "

## 2025-02-05 ENCOUNTER — TELEMEDICINE (OUTPATIENT)
Dept: PSYCHIATRY | Facility: CLINIC | Age: 45
End: 2025-02-05
Payer: MEDICARE

## 2025-02-05 ENCOUNTER — PATIENT OUTREACH (OUTPATIENT)
Dept: SURGERY | Facility: CLINIC | Age: 45
End: 2025-02-05

## 2025-02-05 DIAGNOSIS — F43.10 PTSD (POST-TRAUMATIC STRESS DISORDER): ICD-10-CM

## 2025-02-05 DIAGNOSIS — F31.4 BIPOLAR 1 DISORDER, DEPRESSED, SEVERE (HCC): Primary | ICD-10-CM

## 2025-02-05 DIAGNOSIS — F41.1 GENERALIZED ANXIETY DISORDER WITH PANIC ATTACKS: ICD-10-CM

## 2025-02-05 DIAGNOSIS — F41.0 GENERALIZED ANXIETY DISORDER WITH PANIC ATTACKS: ICD-10-CM

## 2025-02-05 DIAGNOSIS — G47.00 INSOMNIA, UNSPECIFIED TYPE: ICD-10-CM

## 2025-02-05 PROCEDURE — 90833 PSYTX W PT W E/M 30 MIN: CPT | Performed by: PSYCHIATRY & NEUROLOGY

## 2025-02-05 PROCEDURE — 99214 OFFICE O/P EST MOD 30 MIN: CPT | Performed by: PSYCHIATRY & NEUROLOGY

## 2025-02-05 PROCEDURE — G2211 COMPLEX E/M VISIT ADD ON: HCPCS | Performed by: PSYCHIATRY & NEUROLOGY

## 2025-02-05 RX ORDER — BUPROPION HYDROCHLORIDE 200 MG/1
200 TABLET, EXTENDED RELEASE ORAL DAILY
Qty: 90 TABLET | Refills: 1 | Status: SHIPPED | OUTPATIENT
Start: 2025-03-05

## 2025-02-05 RX ORDER — QUETIAPINE FUMARATE 400 MG/1
800 TABLET, FILM COATED ORAL
Qty: 180 TABLET | Refills: 1 | Status: SHIPPED | OUTPATIENT
Start: 2025-03-05

## 2025-02-05 RX ORDER — RISPERIDONE 3 MG/1
3 TABLET ORAL
Qty: 90 TABLET | Refills: 1 | Status: SHIPPED | OUTPATIENT
Start: 2025-03-05

## 2025-02-05 RX ORDER — GABAPENTIN 800 MG/1
800 TABLET ORAL 4 TIMES DAILY
Qty: 120 TABLET | Refills: 5 | Status: SHIPPED | OUTPATIENT
Start: 2025-02-19

## 2025-02-05 RX ORDER — HYDROXYZINE PAMOATE 50 MG/1
50 CAPSULE ORAL
Qty: 90 CAPSULE | Refills: 1 | Status: SHIPPED | OUTPATIENT
Start: 2025-02-05

## 2025-02-05 RX ORDER — LORAZEPAM 2 MG/1
2 TABLET ORAL
Qty: 30 TABLET | Refills: 5 | Status: SHIPPED | OUTPATIENT
Start: 2025-03-05

## 2025-02-05 RX ORDER — BUSPIRONE HYDROCHLORIDE 15 MG/1
15 TABLET ORAL 3 TIMES DAILY
Qty: 270 TABLET | Refills: 1 | Status: SHIPPED | OUTPATIENT
Start: 2025-03-05

## 2025-02-05 RX ORDER — BENZTROPINE MESYLATE 2 MG/1
2 TABLET ORAL 2 TIMES DAILY
Qty: 180 TABLET | Refills: 1 | Status: SHIPPED | OUTPATIENT
Start: 2025-03-05

## 2025-02-05 RX ORDER — LORAZEPAM 0.5 MG/1
0.5 TABLET ORAL 2 TIMES DAILY
Qty: 60 TABLET | Refills: 5 | Status: SHIPPED | OUTPATIENT
Start: 2025-03-05
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
## 2025-02-07 ENCOUNTER — PATIENT OUTREACH (OUTPATIENT)
Dept: SURGERY | Facility: CLINIC | Age: 45
End: 2025-02-07

## 2025-02-07 ENCOUNTER — TELEMEDICINE (OUTPATIENT)
Dept: BEHAVIORAL/MENTAL HEALTH CLINIC | Facility: CLINIC | Age: 45
End: 2025-02-07
Payer: MEDICARE

## 2025-02-07 DIAGNOSIS — F33.3 MAJOR DEPRESSIVE DISORDER, RECURRENT, SEVERE WITH PSYCHOTIC FEATURES (HCC): Primary | ICD-10-CM

## 2025-02-07 DIAGNOSIS — F31.4 BIPOLAR 1 DISORDER, DEPRESSED, SEVERE (HCC): ICD-10-CM

## 2025-02-07 PROCEDURE — 90834 PSYTX W PT 45 MINUTES: CPT | Performed by: COUNSELOR

## 2025-02-08 NOTE — PSYCH
Virtual Regular Visit  Name: Edward Wong      : 1980      MRN: [id number]  Encounter Provider: ROSIE Peace  Encounter Date: 2025   Encounter department: [facility] [address]      Verification of patient location:  Patient is located at Home in the following state in which I hold an active license [place] :  Assessment & Plan  Major depressive disorder, recurrent, severe with psychotic features (HCC)         Bipolar 1 disorder, depressed, severe (HCC)             Encounter provider ROSIE Peace    The patient was identified by name and date of birth. Edward Wong was informed that this is a telemedicine visit and that the visit is being conducted through the Epic Embedded platform. He agrees to proceed..  My office door was closed. No one else was in the room.  He acknowledged consent and understanding of privacy and security of the video platform. The patient has agreed to participate and understands they can discontinue the visit at any time.    Patient is aware this is a billable service.     History of Present Illness     HPI  Review of Systems    Objective   There were no vitals taken for this visit.    Physical Exam    Behavioral Health Psychotherapy Progress Note    Psychotherapy Provided: Individual Psychotherapy     1. Major depressive disorder, recurrent, severe with psychotic features (HCC)        2. Bipolar 1 disorder, depressed, severe (HCC)            Goals addressed in session: Goal 1 and Goal 2     DATA:     Met with Charlee for his 2x month individual therapy session. Reviewed sources of recent stressors including health and family dysfunctional patterns.  Today session is focused on helping Edward increasing insight, understanding triggers and practicing self-compassion to alleviate chronicity of symptoms. Therapeutic techniques used involved the exploration of triggers for deregulated mood, setting healthy boundaries and  self-differentiation for stress reduction. Reported uncomfortable emotions causing increased irritability and upsetting mood linked to family dysfunctional communication patterns and cut offs. Shared concerns, worries noting brother continues displaying abusive behaviors toward his mom, however he feels mom and twin brother enabled his brother who struggles with challenging mental health condition and substance abuse. Added episodes of frustrations, sadness and lethargic mood. Clinician encouraged practicing healthy boundaries, understanding he can only control his own reactions, thoughts and emotions but others. Clinician validates intense emotions while provided reassurance. Advised practicing gratitude, relaxation techniques and self-awareness for symptoms reduction. Allowed processing emotions, finding meaning and assisting normalizing responses. Therapist praised Edward's sense of advocacy, resiliency and adaptability. Charlee and therapist engaged in narrative therapy to allow free exploration of experiences. Worked on culturally adapted CBT to challenge negative thoughts to replace for positive ones. Charlee social life now revolves around taking care of pet, Working out, cooking.      During this session, this clinician used the following therapeutic modalities: Engagement Strategies, Cognitive Behavioral Therapy, Cognitive Processing Therapy, Mindfulness-based Strategies, Solution-Focused Therapy, and Supportive Psychotherapy    Substance Abuse was not addressed during this session. If the client is diagnosed with a co-occurring substance use disorder, please indicate any changes in the frequency or amount of use: . Stage of change for addressing substance use diagnoses: No substance use/Not applicable    ASSESSMENT:  Edward Wong presents with a Euthymic/ normal, Anxious, and Depressed mood.     his affect is Normal range and intensity, Blunted, and Constricted, which is congruent, with his  "mood and the content of the session. The client has not made progress on their goals.     Edward Wong presents with a none risk of suicide, none risk of self-harm, and none risk of harm to others.    For any risk assessment that surpasses a \"low\" rating, a safety plan must be developed.    A safety plan was indicated: no  If yes, describe in detail     PLAN: Between sessions, Edward Wong will self differentiate to separate his emotions/behaviors/actions from others for his mental health. At the next session, the therapist will use Engagement Strategies, Client-centered Therapy, Cognitive Behavioral Therapy, Cognitive Processing Therapy, Dialectical Behavior Therapy, Solution-Focused Therapy, and Supportive Psychotherapy to address stressors.    Behavioral Health Treatment Plan and Discharge Planning: Edward Wong is aware of and agrees to continue to work on their treatment plan. They have identified and are working toward their discharge goals. yes    Depression Follow-up Plan Completed: No    Visit start and stop times:    02/7/25    Start Time: 0100  Stop Time: 0145  Total Visit Time: 45 minutes  "

## 2025-02-10 ENCOUNTER — TELEPHONE (OUTPATIENT)
Age: 45
End: 2025-02-10

## 2025-02-10 ENCOUNTER — TELEPHONE (OUTPATIENT)
Dept: PSYCHIATRY | Facility: CLINIC | Age: 45
End: 2025-02-10

## 2025-02-11 ENCOUNTER — TELEPHONE (OUTPATIENT)
Dept: BEHAVIORAL/MENTAL HEALTH CLINIC | Facility: CLINIC | Age: 45
End: 2025-02-11

## 2025-02-13 ENCOUNTER — PATIENT OUTREACH (OUTPATIENT)
Dept: SURGERY | Facility: CLINIC | Age: 45
End: 2025-02-13

## 2025-02-20 ENCOUNTER — TELEMEDICINE (OUTPATIENT)
Dept: BEHAVIORAL/MENTAL HEALTH CLINIC | Facility: CLINIC | Age: 45
End: 2025-02-20
Payer: MEDICARE

## 2025-02-20 ENCOUNTER — TELEPHONE (OUTPATIENT)
Dept: BEHAVIORAL/MENTAL HEALTH CLINIC | Facility: CLINIC | Age: 45
End: 2025-02-20

## 2025-02-20 DIAGNOSIS — F33.3 MAJOR DEPRESSIVE DISORDER, RECURRENT, SEVERE WITH PSYCHOTIC FEATURES (HCC): Primary | ICD-10-CM

## 2025-02-20 DIAGNOSIS — F31.4 BIPOLAR 1 DISORDER, DEPRESSED, SEVERE (HCC): ICD-10-CM

## 2025-02-20 PROCEDURE — 90837 PSYTX W PT 60 MINUTES: CPT | Performed by: COUNSELOR

## 2025-02-24 ENCOUNTER — PATIENT OUTREACH (OUTPATIENT)
Dept: SURGERY | Facility: CLINIC | Age: 45
End: 2025-02-24

## 2025-02-25 ENCOUNTER — TELEPHONE (OUTPATIENT)
Dept: PSYCHIATRY | Facility: CLINIC | Age: 45
End: 2025-02-25

## 2025-02-25 NOTE — TELEPHONE ENCOUNTER
Left voicemail informing patient and/or parent/guardian of the Psych Encounter form needing to be signed as a requirement from the insurance company for billing purposes. Patient can access form via [employer] and sign electronically.     Please make patient aware this form must be signed for each visit as a requirement to continue future visits with provider.    Virtual Encounter form 2/20/25 call #1

## 2025-02-26 ENCOUNTER — PATIENT OUTREACH (OUTPATIENT)
Dept: SURGERY | Facility: CLINIC | Age: 45
End: 2025-02-26

## 2025-02-26 NOTE — PSYCH
Virtual Regular VisitName: Edward Wong      : 1980      MRN: [id number]  Encounter Provider: ROSIE Peace  Encounter Date: 2025   Encounter department: [facility] [address]  :  Assessment & Plan  Major depressive disorder, recurrent, severe with psychotic features (HCC)         Bipolar 1 disorder, depressed, severe (HCC)             History of Present Illness     HPI  Review of Systems    Objective   There were no vitals taken for this visit.    Physical Exam    Administrative Statements   Encounter provider ROSIE Peaec    The Patient is located at Home and in the following state in which I hold an active license [place].    The patient was identified by name and date of birth. Edward Wong was informed that this is a telemedicine visit and that the visit is being conducted through the Epic Embedded platform. He agrees to proceed..  My office door was closed. No one else was in the room.  He acknowledged consent and understanding of privacy and security of the video platform. The patient has agreed to participate and understands they can discontinue the visit at any time.    Behavioral Health Psychotherapy Progress Note    Psychotherapy Provided: Individual Psychotherapy     1. Major depressive disorder, recurrent, severe with psychotic features (HCC)        2. Bipolar 1 disorder, depressed, severe (HCC)            Goals addressed in session: Goal 1     DATA:     Edward continues exhibiting mood fluctuations, low mood and constant worrying. Edward continues sharing frustrations on family dysfunctional patterns, noting he is distancing himself from siblings due their lack of compassion to his mom. Therapist allowed to process while assisted normalizing emotions and acknowledging complexity of environmental situations. Edward was allowed to vent throughout session. Worked on self differentiation to maintain a healthy outlook of life. Edward and  "therapist will continue assessing moods and behaviors and assisting implementing healthy coping mechanisms to alleviate inner tension.      During this session, this clinician used the following therapeutic modalities: Engagement Strategies, Cognitive Behavioral Therapy, Cognitive Processing Therapy, Dialectical Behavior Therapy, Solution-Focused Therapy, and Supportive Psychotherapy    Substance Abuse was not addressed during this session. If the client is diagnosed with a co-occurring substance use disorder, please indicate any changes in the frequency or amount of use: . Stage of change for addressing substance use diagnoses: No substance use/Not applicable    ASSESSMENT:  Edward Wong presents with a Euthymic/ normal, Anxious, and Dysthymic mood.     his affect is Normal range and intensity, Bright, and Constricted, which is congruent, with his mood and the content of the session. The client has not made progress on their goals.     Edward Wong presents with a none risk of suicide, none risk of self-harm, and none risk of harm to others.    For any risk assessment that surpasses a \"low\" rating, a safety plan must be developed.    A safety plan was indicated: no  If yes, describe in detail     PLAN: Between sessions, Edward Wong will work on self differiantion. At the next session, the therapist will use Client-centered Therapy, Cognitive Behavioral Therapy, Cognitive Processing Therapy, and Supportive Psychotherapy to address stressors.    Behavioral Health Treatment Plan and Discharge Planning: Edward Wong is aware of and agrees to continue to work on their treatment plan. They have identified and are working toward their discharge goals. yes    Depression Follow-up Plan Completed: No    Visit start and stop times:    03/03/25  Start Time: 0400  Stop Time: 0500  Total Visit Time: 60 minutes    "

## 2025-02-28 ENCOUNTER — PATIENT OUTREACH (OUTPATIENT)
Dept: SURGERY | Facility: CLINIC | Age: 45
End: 2025-02-28

## 2025-02-28 ENCOUNTER — SOCIAL WORK (OUTPATIENT)
Dept: BEHAVIORAL/MENTAL HEALTH CLINIC | Facility: CLINIC | Age: 45
End: 2025-02-28
Payer: MEDICARE

## 2025-02-28 DIAGNOSIS — F31.4 BIPOLAR 1 DISORDER, DEPRESSED, SEVERE (HCC): ICD-10-CM

## 2025-02-28 DIAGNOSIS — F33.3 MAJOR DEPRESSIVE DISORDER, RECURRENT, SEVERE WITH PSYCHOTIC FEATURES (HCC): Primary | ICD-10-CM

## 2025-02-28 DIAGNOSIS — F41.1 GENERALIZED ANXIETY DISORDER: ICD-10-CM

## 2025-02-28 PROCEDURE — 90837 PSYTX W PT 60 MINUTES: CPT | Performed by: COUNSELOR

## 2025-02-28 NOTE — PSYCH
Behavioral Health Psychotherapy Progress Note    Psychotherapy Provided: Individual Psychotherapy     1. Major depressive disorder, recurrent, severe with psychotic features (HCC)        2. Bipolar 1 disorder, depressed, severe (HCC)        3. Generalized anxiety disorder            Goals addressed in session: Goal 1     DATA:     Reported feeling anxious presenting with rapid speech and body movements (rocking) throughout session. Reported ongoing worries linked with family dysfunctional patterns stating that he is upset at mom covering things up at his drug addict adult brother. Noting feeling disappoint at family members. Edward share sources of worries linked with family matters and news on the government taking away some of the Social security benefits. Therapist allowed venting frustrations, to help normalizing responses. Educated Edward on what he vs what he can not control. Encourage to only focus on himself,  himself from other's personal opinion and choices.      Shared ring this session, this clinician used the following therapeutic modalities. Reported       s: Cognitive Behavioral Therapy, Cognitive Processing Therapy, Dialectical Behavior Therapy, Solution-Focused Therapy, and Supportive Psychotherapy    Substance Abuse was not addressed during this session. If the client is diagnosed with a co-occurring substance use disorder, please indicate any changes in the frequency or amount of use: . Stage of change for addressing substance use diagnoses: No substance use/Not applicable    ASSESSMENT:  Edward Wong presents with a Euthymic/ normal, Angry, Anxious, and Dysthymic mood.     his affect is Normal range and intensity, which is congruent, with his mood and the content of the session. The client has not made progress on their goals.     Edward Wong presents with a none risk of suicide, none risk of self-harm, and none risk of harm to others.    For any risk  "assessment that surpasses a \"low\" rating, a safety plan must be developed.    A safety plan was indicated: no  If yes, describe in detail     PLAN: Between sessions, Edward Tr Wong will practice self compassion. At the next session, the therapist will use Cognitive Behavioral Therapy, Cognitive Processing Therapy, Dialectical Behavior Therapy, Solution-Focused Therapy, and Supportive Psychotherapy to address stressors.    Behavioral Health Treatment Plan and Discharge Planning: Edward Tr Wong is aware of and agrees to continue to work on their treatment plan. They have identified and are working toward their discharge goals. no    Depression Follow-up Plan Completed: No    Visit start and stop times:    02/28/25  Start Time: 0200  Stop Time: 0300  Total Visit Time: 60 minutes  "

## 2025-03-03 ENCOUNTER — TELEPHONE (OUTPATIENT)
Dept: PSYCHIATRY | Facility: CLINIC | Age: 45
End: 2025-03-03

## 2025-03-03 NOTE — TELEPHONE ENCOUNTER
Left voicemail informing patient and/or parent/guardian of the Psych Encounter form needing to be signed as a requirement from the insurance company for billing purposes. Patient can access form via [employer] and sign electronically.     Please make patient aware this form must be signed for each visit as a requirement to continue future visits with provider.

## 2025-03-03 NOTE — TELEPHONE ENCOUNTER
Writer called pt to inform him that his appt with Ninfa Li on 3/7/25 has to be re scheduled. Pt does not wish to re schedule at this time but he said he's keeping his future appts. He mentioned that his mom would do the same.

## 2025-03-04 ENCOUNTER — PATIENT OUTREACH (OUTPATIENT)
Dept: SURGERY | Facility: CLINIC | Age: 45
End: 2025-03-04

## 2025-03-06 ENCOUNTER — TELEMEDICINE (OUTPATIENT)
Dept: PSYCHIATRY | Facility: CLINIC | Age: 45
End: 2025-03-06
Payer: MEDICARE

## 2025-03-06 DIAGNOSIS — F31.4 BIPOLAR 1 DISORDER, DEPRESSED, SEVERE (HCC): Primary | ICD-10-CM

## 2025-03-06 DIAGNOSIS — F11.20 OPIOID DEPENDENCE, UNCOMPLICATED (HCC): ICD-10-CM

## 2025-03-06 DIAGNOSIS — F41.0 GENERALIZED ANXIETY DISORDER WITH PANIC ATTACKS: ICD-10-CM

## 2025-03-06 DIAGNOSIS — F41.1 GENERALIZED ANXIETY DISORDER WITH PANIC ATTACKS: ICD-10-CM

## 2025-03-06 DIAGNOSIS — F43.10 PTSD (POST-TRAUMATIC STRESS DISORDER): ICD-10-CM

## 2025-03-06 DIAGNOSIS — G47.00 INSOMNIA, UNSPECIFIED TYPE: ICD-10-CM

## 2025-03-06 PROCEDURE — 90833 PSYTX W PT W E/M 30 MIN: CPT | Performed by: PSYCHIATRY & NEUROLOGY

## 2025-03-06 PROCEDURE — 99214 OFFICE O/P EST MOD 30 MIN: CPT | Performed by: PSYCHIATRY & NEUROLOGY

## 2025-03-06 PROCEDURE — G2211 COMPLEX E/M VISIT ADD ON: HCPCS | Performed by: PSYCHIATRY & NEUROLOGY

## 2025-03-06 NOTE — ASSESSMENT & PLAN NOTE
Somewhat stable.  Continue Risperdal 3 mg nightly  Continue Seroquel 800 mg nightly               - Currently on dual antipsychotic therapy due to failure with monotherapy; benefit of being on dual antipsychotics outweighs the risks. Has been tolerating this regimen well.                

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
## 2025-03-06 NOTE — ASSESSMENT & PLAN NOTE
Not currently on SSRI/SNRI, will avoid for now due to polypharmacy  Continue psychotherapy with [facility] therapist, Ninfa Beaevr

## 2025-03-11 ENCOUNTER — TELEPHONE (OUTPATIENT)
Dept: PSYCHIATRY | Facility: CLINIC | Age: 45
End: 2025-03-11

## 2025-03-11 NOTE — TELEPHONE ENCOUNTER
Left voicemail informing patient and/or parent/guardian of the Psych Encounter form needing to be signed as a requirement from the insurance company for billing purposes. Patient can access form via [employer] and sign electronically.     Please make patient aware this form must be signed for each visit as a requirement to continue future visits with provider.    Virtual Encounter form 3/6/25 call #1

## 2025-03-12 ENCOUNTER — PATIENT OUTREACH (OUTPATIENT)
Dept: SURGERY | Facility: CLINIC | Age: 45
End: 2025-03-12

## 2025-03-13 ENCOUNTER — PATIENT OUTREACH (OUTPATIENT)
Dept: SURGERY | Facility: CLINIC | Age: 45
End: 2025-03-13

## 2025-03-14 ENCOUNTER — TELEMEDICINE (OUTPATIENT)
Dept: BEHAVIORAL/MENTAL HEALTH CLINIC | Facility: CLINIC | Age: 45
End: 2025-03-14
Payer: MEDICARE

## 2025-03-14 DIAGNOSIS — F43.10 PTSD (POST-TRAUMATIC STRESS DISORDER): Primary | ICD-10-CM

## 2025-03-14 DIAGNOSIS — F31.4 BIPOLAR 1 DISORDER, DEPRESSED, SEVERE (HCC): ICD-10-CM

## 2025-03-14 PROCEDURE — 90832 PSYTX W PT 30 MINUTES: CPT | Performed by: COUNSELOR

## 2025-03-14 NOTE — PSYCH
Virtual Regular VisitName: Edward Wong      : 1980      MRN: [id number]  Encounter Provider: ROSIE Peace  Encounter Date: 3/14/2025   Encounter department: [facility] [address]  :  Assessment & Plan  PTSD (post-traumatic stress disorder)         Bipolar 1 disorder, depressed, severe (HCC)             History of Present Illness     HPI  Review of Systems    Objective   There were no vitals taken for this visit.    Physical Exam    Administrative Statements   Encounter provider ROSIE Peace    The Patient is located at Home and in the following state in which I hold an active license [place].    The patient was identified by name and date of birth. Edward Wong was informed that this is a telemedicine visit and that the visit is being conducted through the Epic Embedded platform. He agrees to proceed..  My office door was closed. No one else was in the room.  He acknowledged consent and understanding of privacy and security of the video platform. The patient has agreed to participate and understands they can discontinue the visit at any time.    I have spent a total time of 30 minutes in caring for this patient on the day of the visit/encounter including Counseling / Coordination of care, not including the time spent for establishing the audio/video connection.    Behavioral Health Psychotherapy Progress Note    Psychotherapy Provided: Individual Psychotherapy     1. PTSD (post-traumatic stress disorder)        2. Bipolar 1 disorder, depressed, severe (HCC)            Goals addressed in session: Goal 1 and Goal 2     DATA:     Edward endorsed angered responses today referring to not getting his EBT food stampts. Claim frustrations as he has not heard yet from [employer] in reference with the suspensions of the services.  Edward reported trouble communicating with the welfare offices indicating his inability to get groceries this weekend. Reported his efforts on  "attempting to communicate with them. [place] Then was told that he was reaching out to the wrong webpage which may be a spam. [employer] representative advise and update on the information he needed. [place] Will have his food stamps reconnected on the 03/17/25. He appeared less tensed, noting is heading out to the hospital to visit his mom. Therapist showed genuine validation assisting with community  resources.       During this session, this clinician used the following therapeutic modalities: Client-centered Therapy, Cognitive Behavioral Therapy, Cognitive Processing Therapy, Solution-Focused Therapy, and Supportive Psychotherapy    Substance Abuse was not addressed during this session. If the client is diagnosed with a co-occurring substance use disorder, please indicate any changes in the frequency or amount of use: . Stage of change for addressing substance use diagnoses: No substance use/Not applicable    ASSESSMENT:  Edward Wong presents with a Euthymic/ normal, Anxious, and Dysthymic mood.     his affect is Normal range and intensity, Blunted, and Constricted, which is congruent, with his mood and the content of the session. The client has not made progress on their goals.     Edward Wong presents with a none risk of suicide, none risk of self-harm, and none risk of harm to others.    For any risk assessment that surpasses a \"low\" rating, a safety plan must be developed.    A safety plan was indicated: no  If yes, describe in detail     PLAN: Between sessions, Edward Wong will engage in healthy distraction technique to alleviate tension. At the next session, the therapist will use Engagement Strategies, Cognitive Behavioral Therapy, Cognitive Processing Therapy, Dialectical Behavior Therapy, and Mindfulness-based Strategies to address stressors.    Behavioral Health Treatment Plan and Discharge Planning: Edward Wong is aware of and agrees to continue to " work on their treatment plan. They have identified and are working toward their discharge goals. yes    Depression Follow-up Plan Completed: No    Visit start and stop times:    03/14/25  Start Time: 0130  Stop Time: 0200  Total Visit Time: 30 minutes

## 2025-03-17 ENCOUNTER — TELEPHONE (OUTPATIENT)
Dept: PSYCHIATRY | Facility: CLINIC | Age: 45
End: 2025-03-17

## 2025-03-17 NOTE — TELEPHONE ENCOUNTER
Unable to leave voicemail informing patient of the Psych Encounter form needing to be signed as a requirement from the insurance company for billing purposes. If patients contacts office, please make patient aware that the form can be accessed via [employer] to sign electronically and must be signed for each visit as a requirement to continue future visits with provider.    Patient needs interpretor.

## 2025-03-18 ENCOUNTER — PATIENT OUTREACH (OUTPATIENT)
Dept: SURGERY | Facility: CLINIC | Age: 45
End: 2025-03-18

## 2025-03-19 ENCOUNTER — TELEPHONE (OUTPATIENT)
Age: 45
End: 2025-03-19

## 2025-03-21 ENCOUNTER — SOCIAL WORK (OUTPATIENT)
Dept: BEHAVIORAL/MENTAL HEALTH CLINIC | Facility: CLINIC | Age: 45
End: 2025-03-21
Payer: MEDICARE

## 2025-03-21 ENCOUNTER — PATIENT OUTREACH (OUTPATIENT)
Dept: SURGERY | Facility: CLINIC | Age: 45
End: 2025-03-21

## 2025-03-21 DIAGNOSIS — F31.4 BIPOLAR 1 DISORDER, DEPRESSED, SEVERE (HCC): Primary | ICD-10-CM

## 2025-03-21 DIAGNOSIS — F43.10 PTSD (POST-TRAUMATIC STRESS DISORDER): ICD-10-CM

## 2025-03-21 PROCEDURE — 90832 PSYTX W PT 30 MINUTES: CPT | Performed by: COUNSELOR

## 2025-03-21 NOTE — PSYCH
Behavioral Health Psychotherapy Progress Note    Psychotherapy Provided: Individual Psychotherapy     1. Bipolar 1 disorder, depressed, severe (HCC)        2. PTSD (post-traumatic stress disorder)            Goals addressed in session: Goal 1     DATA:     [facility] conducted and individual therapy session this morning. Clinician proceed to check on recent challenging events and their impact on ANTONELLA emotional states and wellbeing. ANTONELLA claimed  still struggling with environmental stressors including ongoing arguments with loved ones. ANTONELLA's mental health seems affected by adult brother with drug and alcohol problems and mom enabling behavior toward brother. He endorsed angered responses referring to the dysfunctional family dynamic. ANTONELLA. also presented lethargic, tire, rocking body movement stating “he can not sleep well when knows he has an appoint. Next day”. G then demonstrated more animosity by the end of this session. Engaged  EDWARD reviewing last session assignment on practicing gratitude, positive affirmations. Worked on challenging negative self-talkClinician validates showing compassionate understanding. Allowed processing emotions around identified stressors, finding meaning and assisting normalizing responses. Therapist praised B's sense of advocacy, resiliency, and adaptability.  Ended engaged in narrative therapy to allow free exploration of experiences. Encourage to explore how pain can be transformed on a powerful healing tool to decrease symptoms.  A to whom it may concern letter provided as per his request. Edward and therapist discussed ways to improve sleep habits to regulate moods and calm nervous system.    During this session, this clinician used the following therapeutic modalities: Client-centered Therapy, Cognitive Behavioral Therapy, Cognitive Processing Therapy, Solution-Focused Therapy, and Supportive Psychotherapy    Substance Abuse was not addressed during this session. If the client is diagnosed  "with a co-occurring substance use disorder, please indicate any changes in the frequency or amount of use: . Stage of change for addressing substance use diagnoses: No substance use/Not applicable    ASSESSMENT:  Edward Wong presents with a Euthymic/ normal, Anxious, and Depressed mood.     his affect is Normal range and intensity, Blunted, and Bright, which is congruent, with his mood and the content of the session. The client has not made progress on their goals.     Edward Wong presents with a none risk of suicide, none risk of self-harm, and none risk of harm to others.    For any risk assessment that surpasses a \"low\" rating, a safety plan must be developed.    A safety plan was indicated: no  If yes, describe in detail     PLAN: Between sessions, Edward Wong will practice gratitude and positive affirmations. TO reassess sleep patterns avoiding screen time at least 1/2 before bed time. At the next session, the therapist will use Cognitive Behavioral Therapy, Cognitive Processing Therapy, Dialectical Behavior Therapy, Solution-Focused Therapy, and Supportive Psychotherapy to address stressors.    Behavioral Health Treatment Plan and Discharge Planning: Edward Wong is aware of and agrees to continue to work on their treatment plan. They have identified and are working toward their discharge goals. yes    Depression Follow-up Plan Completed: Yes    Visit start and stop times:    03/21/25  Start Time: 0130  Stop Time: 0200  Total Visit Time: 30 minutes  "

## 2025-04-02 ENCOUNTER — TELEPHONE (OUTPATIENT)
Age: 45
End: 2025-04-02

## 2025-04-02 ENCOUNTER — PATIENT OUTREACH (OUTPATIENT)
Dept: SURGERY | Facility: CLINIC | Age: 45
End: 2025-04-02

## 2025-04-02 NOTE — TELEPHONE ENCOUNTER
Pt called to get his appt for 4/3 at 2pm switched to a virtual visit due to N/A.  Pt has [employer] and will connect with provider through [employer]. Writer switched appt and checked it in.

## 2025-04-03 ENCOUNTER — TELEMEDICINE (OUTPATIENT)
Dept: BEHAVIORAL/MENTAL HEALTH CLINIC | Facility: CLINIC | Age: 45
End: 2025-04-03
Payer: MEDICARE

## 2025-04-03 ENCOUNTER — TELEPHONE (OUTPATIENT)
Dept: PSYCHIATRY | Facility: CLINIC | Age: 45
End: 2025-04-03

## 2025-04-03 DIAGNOSIS — F41.1 GENERALIZED ANXIETY DISORDER WITH PANIC ATTACKS: ICD-10-CM

## 2025-04-03 DIAGNOSIS — F43.10 PTSD (POST-TRAUMATIC STRESS DISORDER): ICD-10-CM

## 2025-04-03 DIAGNOSIS — F41.0 GENERALIZED ANXIETY DISORDER WITH PANIC ATTACKS: ICD-10-CM

## 2025-04-03 DIAGNOSIS — G47.00 INSOMNIA, UNSPECIFIED TYPE: ICD-10-CM

## 2025-04-03 DIAGNOSIS — F33.3 MAJOR DEPRESSIVE DISORDER, RECURRENT, SEVERE WITH PSYCHOTIC FEATURES (HCC): Primary | ICD-10-CM

## 2025-04-03 PROCEDURE — 90832 PSYTX W PT 30 MINUTES: CPT | Performed by: COUNSELOR

## 2025-04-03 NOTE — TELEPHONE ENCOUNTER
Pt called and asked for Lebanese. Writer offered  but he refused the . Pt states he needs to speak to Ninfa Beaver to tell her about this Rx. He states the Rx is written wrong because he takes 3 of the Hydroxyzine in a day. He is requesting it be written for that. Writer verified twice that it is this medication. Writer verified that the prescriber is Kaylyn Guidry. This pt needs an .    Medication Refill Request   Name of Medication hydrOXYzine pamoate (VISTARIL) 50 mg capsule  Dose/Frequency Take 1 capsule (50 mg total) by mouth daily at bedtime  Quantity 90  Verified pharmacy   [x]  Verified ordering Provider   [x]  Does patient have enough for the next 3 days? Yes [] No []  Does patient have a follow-up appointment scheduled? Yes [x] No []  If so when is appointment: 4/10/25

## 2025-04-03 NOTE — TELEPHONE ENCOUNTER
Was called in to psychotherapist's office during appointment to speak to Edward.  Edward reports that his script for Hydroxyzine was sent incorrectly.  It was written for 1 capsule at bedtime however, he takes (3) 150 MG total at bedtime.  He is requesting that a script for the correct amount and dose be sent to the pharmacy and he is also requesting that it be sent for 1 month at a time.  He would like someone to call him when this is done.     Will refer to Dr Poe for review.

## 2025-04-03 NOTE — PSYCH
Virtual Regular VisitName: Edward Wong      : 1980      MRN: [id number]  Encounter Provider: ROSIE Peace  Encounter Date: 4/3/2025   Encounter department: [facility] [address]  :  Assessment & Plan  Major depressive disorder, recurrent, severe with psychotic features (HCC)         PTSD (post-traumatic stress disorder)             History of Present Illness     HPI  Review of Systems    Objective   There were no vitals taken for this visit.    Physical Exam    Administrative Statements   Encounter provider ROSIE Peace    The Patient is located at Home and in the following state in which I hold an active license [place].    The patient was identified by name and date of birth. Edward Wong was informed that this is a telemedicine visit and that the visit is being conducted through the Epic Embedded platform. He agrees to proceed..  My office door was closed. No one else was in the room.  He acknowledged consent and understanding of privacy and security of the video platform. The patient has agreed to participate and understands they can discontinue the visit at any time.    I have spent a total time of 30 minutes in caring for this patient on the day of the visit/encounter including Counseling / Coordination of care, not including the time spent for establishing the audio/video connection.    Behavioral Health Psychotherapy Progress Note    Psychotherapy Provided: Individual Psychotherapy     1. Major depressive disorder, recurrent, severe with psychotic features (HCC)        2. PTSD (post-traumatic stress disorder)            Goals addressed in session: Goal 1 and Goal 2     DATA:     [facility] met with Edward to conducted an individual therapy session. Edward did call the office earlier inquiring from a phone call from this clinician although his med. regimen was his main concern. Edward appeared with upset responses expressing his frustrations on not  "receiving the \"right script\" on his meds. Edward reports that his script for Hydroxyzine was sent incorrectly. It was written for 1 capsule at bedtime however, he takes (3) 150 MG total at bedtime. Therapist carefully listened while asked for clarification. Clinician asked Edward if he allowed the nurse to join us for few minutes to collaborate and listen his concerns. He did accept. Edward requested that a script for the correct amount and dose be sent to the pharmacy and he is also requesting that it be sent for 1 month at a time. Edward asked this clinician for a call after this is solved, although clinician told him someone from the medical staff will give it a call when done. Edward ended session with diminished distress, sharing worries on his mom's health. Clinician thanked patient for genuinely expressed his thoughts and concerns. Praised his sense of self advocacy and collaboration. Advise to discuss any further concerns on his med regimen with his provider at his next week meeting. Encouraged prioritizing self care for optimal emotional health and positive sense of well being. During this session, this clinician used the following therapeutic modalities: Engagement Strategies, Client-centered Therapy, Cognitive Behavioral Therapy, Cognitive Processing Therapy, Dialectical Behavior Therapy, Mindfulness-based Strategies, Solution-Focused Therapy, and Supportive Psychotherapy    Substance Abuse was not addressed during this session. If the client is diagnosed with a co-occurring substance use disorder, please indicate any changes in the frequency or amount of use: . Stage of change for addressing substance use diagnoses: No substance use/Not applicable    ASSESSMENT:  Edward Wong presents with a Euthymic/ normal, Anxious, Dysthymic, and Depressed mood.     his affect is Normal range and intensity, Constricted, and Flat, which is congruent, with his mood and the content of the session. The " "client has not made progress on their goals.     Edward Wong presents with a none risk of suicide, none risk of self-harm, and none risk of harm to others.    For any risk assessment that surpasses a \"low\" rating, a safety plan must be developed.    A safety plan was indicated: no  If yes, describe in detail     PLAN: Between sessions, Edward Wong will wait for medical staff to give him a call in regards meds. . At the next session, the therapist will use Engagement Strategies, Cognitive Behavioral Therapy, Cognitive Processing Therapy, Dialectical Behavior Therapy, Solution-Focused Therapy, and Supportive Psychotherapy to address stressors.    Behavioral Health Treatment Plan and Discharge Planning: Edward Wong is aware of and agrees to continue to work on their treatment plan. They have identified and are working toward their discharge goals. yes    Depression Follow-up Plan Completed: No    Visit start and stop times:    04/03/25  Start Time: 0230  Stop Time: 0300  Total Visit Time: 30 minutes      "

## 2025-04-04 RX ORDER — HYDROXYZINE PAMOATE 50 MG/1
150 CAPSULE ORAL
Qty: 90 CAPSULE | Refills: 5 | Status: SHIPPED | OUTPATIENT
Start: 2025-04-04

## 2025-04-04 NOTE — TELEPHONE ENCOUNTER
Pt called in about a refill for his medication Hydroxyzine 50 mg. Writer checked the chart and informed pt there was a refill sent to his pharmacy with 5 refills. Pt will contact the pharmacy. Writer used the language line for assistance.

## 2025-04-08 ENCOUNTER — PATIENT OUTREACH (OUTPATIENT)
Dept: SURGERY | Facility: CLINIC | Age: 45
End: 2025-04-08

## 2025-04-08 ENCOUNTER — TELEPHONE (OUTPATIENT)
Dept: PSYCHIATRY | Facility: CLINIC | Age: 45
End: 2025-04-08

## 2025-04-08 NOTE — TELEPHONE ENCOUNTER
Left voicemail informing patient and/or parent/guardian of the Psych Encounter form needing to be signed as a requirement from the insurance company for billing purposes. Patient can access form via [employer] and sign electronically.     Please make patient aware this form must be signed for each visit as a requirement to continue future visits with provider.    Virtual Encounter form 4/3/25 call #!

## 2025-04-10 ENCOUNTER — PATIENT OUTREACH (OUTPATIENT)
Dept: SURGERY | Facility: CLINIC | Age: 45
End: 2025-04-10

## 2025-04-10 ENCOUNTER — TELEMEDICINE (OUTPATIENT)
Dept: PSYCHIATRY | Facility: CLINIC | Age: 45
End: 2025-04-10

## 2025-04-10 DIAGNOSIS — Z91.199 NO-SHOW FOR APPOINTMENT: Primary | ICD-10-CM

## 2025-04-11 ENCOUNTER — SOCIAL WORK (OUTPATIENT)
Dept: BEHAVIORAL/MENTAL HEALTH CLINIC | Facility: CLINIC | Age: 45
End: 2025-04-11
Payer: MEDICARE

## 2025-04-11 ENCOUNTER — PATIENT OUTREACH (OUTPATIENT)
Dept: SURGERY | Facility: CLINIC | Age: 45
End: 2025-04-11

## 2025-04-11 DIAGNOSIS — F33.3 MAJOR DEPRESSIVE DISORDER, RECURRENT, SEVERE WITH PSYCHOTIC FEATURES (HCC): Primary | ICD-10-CM

## 2025-04-11 DIAGNOSIS — F43.10 PTSD (POST-TRAUMATIC STRESS DISORDER): ICD-10-CM

## 2025-04-11 PROCEDURE — 90832 PSYTX W PT 30 MINUTES: CPT | Performed by: COUNSELOR

## 2025-04-11 NOTE — PSYCH
"Behavioral Health Psychotherapy Progress Note    Psychotherapy Provided: Individual Psychotherapy     1. Major depressive disorder, recurrent, severe with psychotic features (HCC)        2. PTSD (post-traumatic stress disorder)            Goals addressed in session: Goal 1 and Goal 2     DATA:     Edward attended his today session. He endorses difficulties self regulating due increased stressors. Went to the doctor for Colonoscopy which it was found with a Polyp. Edward worries in regards family history of colon cancer after finding. Polyp was removed and recommended to repeat testing in five years which he disagreed. Clinician helped venting uncomfortable emotions and to self regulate frustrations. Edward noted not much changes on sleep patterns. He was highly distracted on today's session checking and rechecking on his old phone as it was broken despite redirection. Reported experiencing psychosis stating \"like someone is knocking my door,\" Reported he tends to respond, by checking on the door which is sometimes interrupting his sleep. Therapist continue assessing nature and impact of current environmental stressors, identify sources of anxiety and fears. Encouraged participation on activities in which he will experience success, achievement and mood stabilization.      During this session, this clinician used the following therapeutic modalities: Client-centered Therapy, Cognitive Behavioral Therapy, Cognitive Processing Therapy, Solution-Focused Therapy, and Supportive Psychotherapy    Substance Abuse was not addressed during this session. If the client is diagnosed with a co-occurring substance use disorder, please indicate any changes in the frequency or amount of use: . Stage of change for addressing substance use diagnoses: No substance use/Not applicable    ASSESSMENT:  Edward Wong presents with a Euthymic/ normal, Anxious, and Dysthymic mood. Edward engaged on repetitive body movements in " "forms of rocking      his affect is Normal range and intensity, Bright, and Constricted, which is congruent, with his mood and the content of the session. The client has not made progress on their goals.     Edward Wong presents with a none risk of suicide, none risk of self-harm, and none risk of harm to others.    For any risk assessment that surpasses a \"low\" rating, a safety plan must be developed.    A safety plan was indicated: no  If yes, describe in detail     PLAN: Between sessions, Edward Wong will practice positive self talk to challenge irrational thoughts. At the next session, the therapist will use Engagement Strategies, Cognitive Behavioral Therapy, Cognitive Processing Therapy, Dialectical Behavior Therapy, Solution-Focused Therapy, and Supportive Psychotherapy to address stressors.    Behavioral Health Treatment Plan and Discharge Planning: Edward Wong is aware of and agrees to continue to work on their treatment plan. They have identified and are working toward their discharge goals. yes    Depression Follow-up Plan Completed: No    Visit start and stop times:    04/11/25  Start Time: 0200  Stop Time: 0230  Total Visit Time: 30 minutes  "

## 2025-04-11 NOTE — PSYCH
No Call. No Show. No Charge    Edward Wong no showed 04/11/25 appointment , staff called and left message to reschedule appointment     Treatment Plan not due at this session.

## 2025-04-14 ENCOUNTER — TELEPHONE (OUTPATIENT)
Age: 45
End: 2025-04-14

## 2025-04-14 ENCOUNTER — TELEPHONE (OUTPATIENT)
Dept: PSYCHIATRY | Facility: CLINIC | Age: 45
End: 2025-04-14

## 2025-04-14 NOTE — TELEPHONE ENCOUNTER
Unable to leave voicemail informing patient of the Psych Encounter form needing to be signed as a requirement from the insurance company for billing purposes. If patients contacts office, please make patient aware that the form can be accessed via [employer] to sign electronically and must be signed for each visit as a requirement to continue future visits with provider.    Patient needs a translater.

## 2025-04-15 ENCOUNTER — PATIENT OUTREACH (OUTPATIENT)
Dept: SURGERY | Facility: CLINIC | Age: 45
End: 2025-04-15

## 2025-04-16 ENCOUNTER — PATIENT OUTREACH (OUTPATIENT)
Dept: SURGERY | Facility: CLINIC | Age: 45
End: 2025-04-16

## 2025-04-16 ENCOUNTER — TELEMEDICINE (OUTPATIENT)
Dept: BEHAVIORAL/MENTAL HEALTH CLINIC | Facility: CLINIC | Age: 45
End: 2025-04-16
Payer: MEDICARE

## 2025-04-16 DIAGNOSIS — F33.3 MAJOR DEPRESSIVE DISORDER, RECURRENT, SEVERE WITH PSYCHOTIC FEATURES (HCC): ICD-10-CM

## 2025-04-16 DIAGNOSIS — F31.4 BIPOLAR 1 DISORDER, DEPRESSED, SEVERE (HCC): Primary | ICD-10-CM

## 2025-04-16 DIAGNOSIS — F41.1 GENERALIZED ANXIETY DISORDER: ICD-10-CM

## 2025-04-16 PROCEDURE — 90837 PSYTX W PT 60 MINUTES: CPT | Performed by: COUNSELOR

## 2025-04-16 NOTE — PSYCH
Virtual Regular VisitName: Edward Wong      : 1980      MRN: [id number]  Encounter Provider: ROSIE Peace  Encounter Date: 2025   Encounter department: [facility] [address]  :  Assessment & Plan  Bipolar 1 disorder, depressed, severe (HCC)         Generalized anxiety disorder         Major depressive disorder, recurrent, severe with psychotic features (HCC)             History of Present Illness     HPI  Review of Systems    Objective   There were no vitals taken for this visit.    Physical Exam    Administrative Statements   Encounter provider ROSIE Peace    The Patient is located at Home and in the following state in which I hold an active license [place].    The patient was identified by name and date of birth. Edward Wong was informed that this is a telemedicine visit and that the visit is being conducted through the Epic Embedded platform. He agrees to proceed..  My office door was closed. No one else was in the room.  He acknowledged consent and understanding of privacy and security of the video platform. The patient has agreed to participate and understands they can discontinue the visit at any time.    I have spent a total time of 54 minutes in caring for this patient on the day of the visit/encounter including Counseling / Coordination of care, not including the time spent for establishing the audio/video connection.    Behavioral Health Psychotherapy Progress Note    Psychotherapy Provided: Individual Psychotherapy     1. Bipolar 1 disorder, depressed, severe (HCC)        2. Generalized anxiety disorder        3. Major depressive disorder, recurrent, severe with psychotic features (HCC)            Goals addressed in session: Goal 1 and Goal 2     DATA:     [facility] conducted an individual therapy session to assess moods, behaviors and overall functioning. Started session with initial check in. Edward joined our today session endorsing lethargic  "mood, still resting on bed (recently wake up) due poor sleep habits. Edward and therapist processed recent stressors impacting functioning. Edward appeared with upset mood referring to his inability to attend last MM appointment. Had a hard time taking ownership on her behaviors stating \"he never received the link\". And that he was waiting for a phone call which never happened. Therapist validates while went over attendance policy and other treatment compliance matters. Advised to always check for missed calls and to make the efforts to re engage in treatment by reaching out to r/s. After his consent clinician passed message to FD for a phone call to r/s his MM appointment. Edward states wanting the doctor to only delivered his meds month by month as he does not feel comfortable storing lots of meds at home. Therapist showed genuine understanding while validates his desire to prioritize his safety. Shared dysfunctional family dynamics causing deregulated responses, including mom and brother's unhealthy relationship. Advised to focus on himself. Edward is looking forward at enrolling on [employer] dancing classes.       During this session, this clinician used the following therapeutic modalities: Client-centered Therapy, Cognitive Behavioral Therapy, Cognitive Processing Therapy, Music Therapy Techniques, Solution-Focused Therapy, and Supportive Psychotherapy    Substance Abuse was not addressed during this session. If the client is diagnosed with a co-occurring substance use disorder, please indicate any changes in the frequency or amount of use: . Stage of change for addressing substance use diagnoses: No substance use/Not applicable    ASSESSMENT:  Edward Wong presents with a Euthymic/ normal, Anxious, and Dysthymic mood.     his affect is Normal range and intensity, Bright, and Constricted, which is congruent, with his mood and the content of the session. The client has not made progress on their " "goals.     Edward Wong presents with a none risk of suicide, none risk of self-harm, and none risk of harm to others.    For any risk assessment that surpasses a \"low\" rating, a safety plan must be developed.    A safety plan was indicated: no  If yes, describe in detail     PLAN: Between sessions, Edward Wong will seek out information for Salsa classes to help alleviating moods and behaviors. At the next session, the therapist will use Engagement Strategies, Mindfulness-based Strategies, Motivational Interviewing, Music Therapy Techniques, Solution-Focused Therapy, and Supportive Psychotherapy to address stressors.    Behavioral Health Treatment Plan and Discharge Planning: Edward Wong is aware of and agrees to continue to work on their treatment plan. They have identified and are working toward their discharge goals. yes    Depression Follow-up Plan Completed: No    Visit start and stop times:    04/16/25  Start Time: 0200  Stop Time: 0254  Total Visit Time: 54 minutes    "

## 2025-04-18 ENCOUNTER — TELEMEDICINE (OUTPATIENT)
Dept: PSYCHIATRY | Facility: CLINIC | Age: 45
End: 2025-04-18
Payer: MEDICARE

## 2025-04-18 DIAGNOSIS — F43.10 PTSD (POST-TRAUMATIC STRESS DISORDER): ICD-10-CM

## 2025-04-18 DIAGNOSIS — F41.1 GENERALIZED ANXIETY DISORDER WITH PANIC ATTACKS: ICD-10-CM

## 2025-04-18 DIAGNOSIS — F41.0 GENERALIZED ANXIETY DISORDER WITH PANIC ATTACKS: ICD-10-CM

## 2025-04-18 DIAGNOSIS — G47.00 INSOMNIA, UNSPECIFIED TYPE: ICD-10-CM

## 2025-04-18 DIAGNOSIS — F31.4 BIPOLAR 1 DISORDER, DEPRESSED, SEVERE (HCC): Primary | ICD-10-CM

## 2025-04-18 PROCEDURE — 99214 OFFICE O/P EST MOD 30 MIN: CPT | Performed by: PSYCHIATRY & NEUROLOGY

## 2025-04-18 RX ORDER — RISPERIDONE 3 MG/1
3 TABLET ORAL
Qty: 30 TABLET | Refills: 5 | Status: SHIPPED | OUTPATIENT
Start: 2025-06-02

## 2025-04-18 RX ORDER — BUPROPION HYDROCHLORIDE 200 MG/1
200 TABLET, EXTENDED RELEASE ORAL DAILY
Qty: 30 TABLET | Refills: 5 | Status: SHIPPED | OUTPATIENT
Start: 2025-06-02

## 2025-04-18 RX ORDER — QUETIAPINE FUMARATE 400 MG/1
800 TABLET, FILM COATED ORAL
Qty: 60 TABLET | Refills: 5 | Status: SHIPPED | OUTPATIENT
Start: 2025-06-02

## 2025-04-18 RX ORDER — BUSPIRONE HYDROCHLORIDE 15 MG/1
15 TABLET ORAL 3 TIMES DAILY
Qty: 90 TABLET | Refills: 5 | Status: SHIPPED | OUTPATIENT
Start: 2025-06-02

## 2025-04-18 NOTE — ASSESSMENT & PLAN NOTE
Continue Ativan 0.5 mg twice daily/Ativan 2 mg nightly  Continue BuSpar 15 mg 3 times daily  Continue Hydroxyzine pamoate 150 mg nightly  Continue Wellbutrin SR, Risperdal, Seroquel, Gabapentin as above  Lyrica per pain medicine  Continue psychotherapy with [facility] therapist, Ninfa Beaver    Orders:    busPIRone (BUSPAR) 15 mg tablet; Take 1 tablet (15 mg total) by mouth 3 (three) times a day Do not start before June 2, 2025.    QUEtiapine (SEROquel) 400 MG tablet; Take 2 tablets (800 mg total) by mouth daily at bedtime Do not start before June 2, 2025.    risperiDONE (RisperDAL) 3 mg tablet; Take 1 tablet (3 mg total) by mouth daily at bedtime Do not start before June 2, 2025.

## 2025-04-18 NOTE — ASSESSMENT & PLAN NOTE
Continue Seroquel, Hydroxyzine pamoate, Ativan, Risperdal, gabapentin as above     Orders:    QUEtiapine (SEROquel) 400 MG tablet; Take 2 tablets (800 mg total) by mouth daily at bedtime Do not start before June 2, 2025.    risperiDONE (RisperDAL) 3 mg tablet; Take 1 tablet (3 mg total) by mouth daily at bedtime Do not start before June 2, 2025.

## 2025-04-18 NOTE — PSYCH
MEDICATION MANAGEMENT NOTE    Name: Edward Wong      : 1980      MRN: [id number]  Encounter Provider: Kaylyn Poe MD  Encounter Date: 2025   Encounter department: [facility]    Insurance: Payor: [employer]  REP / Plan: [employer] ASSURED / Product Type: Medicare HMO /      Reason for Visit:   Chief Complaint   Patient presents with    Follow-up    Depression    Anxiety    Mood Swings    Insomnia    PTSD    Virtual Regular Visit   :  Assessment & Plan  Bipolar 1 disorder, depressed, severe (HCC)  Continue Risperdal 3 mg nightly  Continue Seroquel 800 mg nightly               - Currently on dual antipsychotic therapy due to failure with monotherapy; benefit of being on dual antipsychotics outweighs the risks. Has been tolerating this regimen well.                - Metabolic labs: on statin; lipid panel on 25 wnl, A1c 5.7% on 25  - AIMS: 0 on 25  Continue Cogentin 2 mg twice daily for EPS  Continue Wellbutrin  mg daily               - CYP 2D6 inhibition may lead to increased dose of Risperdal, patient understands risks and would like to continue previous regimen  Continue Gabapentin 800 mg 4 times daily    Orders:    buPROPion (WELLBUTRIN SR) 200 MG 12 hr tablet; Take 1 tablet (200 mg total) by mouth in the morning Do not start before 2025.    QUEtiapine (SEROquel) 400 MG tablet; Take 2 tablets (800 mg total) by mouth daily at bedtime Do not start before 2025.    risperiDONE (RisperDAL) 3 mg tablet; Take 1 tablet (3 mg total) by mouth daily at bedtime Do not start before 2025.    Generalized anxiety disorder with panic attacks  Continue Ativan 0.5 mg twice daily/Ativan 2 mg nightly  Continue BuSpar 15 mg 3 times daily  Continue Hydroxyzine pamoate 150 mg nightly  Continue Wellbutrin SR, Risperdal, Seroquel, Gabapentin as above  Lyrica per pain medicine  Continue psychotherapy  with [facility] therapist, Ninfa Beaver    Orders:    busPIRone (BUSPAR) 15 mg tablet; Take 1 tablet (15 mg total) by mouth 3 (three) times a day Do not start before June 2, 2025.    QUEtiapine (SEROquel) 400 MG tablet; Take 2 tablets (800 mg total) by mouth daily at bedtime Do not start before June 2, 2025.    risperiDONE (RisperDAL) 3 mg tablet; Take 1 tablet (3 mg total) by mouth daily at bedtime Do not start before June 2, 2025.    Insomnia, unspecified type  Continue Seroquel, Hydroxyzine pamoate, Ativan, Risperdal, gabapentin as above     Orders:    QUEtiapine (SEROquel) 400 MG tablet; Take 2 tablets (800 mg total) by mouth daily at bedtime Do not start before June 2, 2025.    risperiDONE (RisperDAL) 3 mg tablet; Take 1 tablet (3 mg total) by mouth daily at bedtime Do not start before June 2, 2025.    PTSD (post-traumatic stress disorder)  Not currently on SSRI/SNRI, will avoid for now due to polypharmacy  Continue psychotherapy with [facility] therapist, Ninfa Beaver           Treatment Recommendations:    Educated about diagnosis and treatment modalities. Verbalizes understanding and agreement with the treatment plan.  Discussed self monitoring of symptoms, and symptom monitoring tools.  Discussed medications and if treatment adjustment was needed or desired.  Aware of 24 hour and weekend coverage for urgent situations accessed by calling [facility] main practice number  I am scheduling this patient out for greater than 3 months: No    Medications Risks/Benefits:      Risks, Benefits And Possible Side Effects Of Medications:    Risks, benefits, and possible side effects of medications explained to Edward Armando and he (or legal representative) verbalizes understanding and agreement for treatment.    Controlled Medication Discussion:     Edward Armando has been filling controlled prescriptions on time as prescribed according to [place] Prescription Drug Monitoring Program.      History of  Present Illness     Patient reports some depression recently. His brother continues to have issues with not following through with drug treatment. Sleep is variable. Denies issues with his medications other than preference to only have a 30 day supply due to history of suicidality. Made this adjustment for future fills during visit.     Review Of Systems: A review of systems is obtained and is negative except for the pertinent positives listed in HPI/Subjective above.      Current Rating Scores:     None completed today.    Areas of Improvement: reviewed in HPI/Subjective Section and reviewed in Assessment and Plan Section    Past Medical History:   Diagnosis Date    Bilateral carpal tunnel syndrome 2/24/2017    Chronic gastritis without bleeding 2/15/2017    Neg CT Scan A+P and EGD in 3.2017 Neg colonoscopy 6.2017 Neg Gastric Emptying study in 4.2017  Last Assessment & Plan:  Better    GERD (gastroesophageal reflux disease)     HIV (human immunodeficiency virus infection) ([facility])     Human immunodeficiency virus I infection ([facility]) 1/6/2017    HIV (+) 2012, CD4 was 28 then, MSM transmission in [place] CD4 288 (11/2016)  HLA  neg 1.2017  cART 1- Started in 5.2012 w/ Atripla, 2- Changed to Stribild in 5.2016 due to elevated TG 3- Change to Genvoya in Summer 2017 to decrease TDF long term AE 4- Change to Triumeq in 4.2018 to decrease TG (still high) and avoid Carlita for long term AE  Last Assessment & Plan:  He was well controlled on his med    Late latent syphilis 5/23/2019    Memory loss     Psychosis ([facility]) 3/8/2019    PTSD (post-traumatic stress disorder) 1/5/2018    Severe recurrent major depressive disorder with psychotic symptoms ([facility]) 11/2/2016    Last Assessment & Plan:  Restarting medications listed on his discharge paperwork from [facility]-Wellbutrin  mg daily, Risperdal 0.5 mg BID and Neurontin 300 mg TID.  Advised patient that I will be stopping Klonopin and Ambien given his ongoing marijuana use.   Reducing Ambien to 5 mg QHS with plan to discontinue at next appointment and tapering off Klonopin over the next month.  He pick    Trouble in sleeping 3/9/2019     Past Surgical History:   Procedure Laterality Date    GROIN EXPLORATION      MANDIBLE SURGERY      NOSE SURGERY       Allergies:   Allergies   Allergen Reactions    Shellfish Allergy - Food Allergy Anaphylaxis     Patient states allergic to shark meat.     Shark Cartilage     Shellfish-Derived Products - Food Allergy        Current Outpatient Medications   Medication Instructions    adapalene (DIFFERIN) 0.1 % gel No dose, route, or frequency recorded.    albuterol (PROVENTIL HFA,VENTOLIN HFA) 90 mcg/act inhaler No dose, route, or frequency recorded.    ascorbic acid (VITAMIN C) 1,000 mg, Daily    atorvastatin (LIPITOR) 20 mg, Daily    benzoyl peroxide 5 % external liquid Apply externally, 2 times daily    benztropine (COGENTIN) 2 mg, Oral, 2 times daily    [START ON 6/2/2025] buPROPion (WELLBUTRIN SR) 200 mg, Oral, Daily    [START ON 6/2/2025] busPIRone (BUSPAR) 15 mg, Oral, 3 times daily    Cabenuva 600 & 900 MG/3ML SUER Inject 6 mL (1,500 mg total) inject into the muscle every 8 weeks (56 days)    celecoxib (CeleBREX) 50 MG capsule TAKE 1 TO 2 CAPSULES BY MOUTH EVERY 12 HOURS WITH FOOD    cetirizine (ZYRTEC) 10 mg, Daily    chlorhexidine (HIBICLENS) 4 % external liquid CIPRIANO DIARIAMENTE CUANDO SEA NECESARIO PARA HERIDA ALEJANDRO FUE DIRIJIDO    Cholecalciferol (VITAMIN D3) 2000 units capsule No dose, route, or frequency recorded.    clindamycin (CLEOCIN T) 1 % lotion No dose, route, or frequency recorded.    dextran 70-hypromellose (GENTEAL TEARS) 0.1-0.3 % ophthalmic solution PONER 2 GOTAS EN EL FELIX(S) 2 VECES AL LINDA    docusate sodium (COLACE) 100 mg, Oral, 2 times daily PRN    famotidine (PEPCID) 20 mg tablet TAKE ONE TABLET BY MOUTH 2 TIMES A DAY AS NEEDED FOR HEARTBURN    fluticasone (FLONASE) 50 mcg/act nasal spray USE TWO SPRAYS IN EACH NOSTRIL TWICE  DAILY    fluticasone-salmeterol (ADVAIR, WIXELA) 100-50 mcg/dose inhaler 1 puff, Inhalation, 2 times daily    gabapentin (NEURONTIN) 800 mg, Oral, 4 times daily    Geraldine-Mucil 68 % POWD     Hydroquinone 4 % EMUL 2 times daily    hydrOXYzine pamoate (VISTARIL) 150 mg, Oral, Daily at bedtime    LORazepam (ATIVAN) 2 mg, Oral, Daily at bedtime, Take with 0.5 mg twice daily for a total of 3 mg a day.    LORazepam (ATIVAN) 0.5 mg, Oral, 2 times daily, Take with 2 mg nightly for a total of 3 mg a day.    Melatonin 5 MG TABS     montelukast (SINGULAIR) 10 mg, Oral    Multiple Vitamin (THERA-TABS) TABS No dose, route, or frequency recorded.    naloxone (NARCAN) 4 mg/0.1 mL nasal spray Administer 1 spray into a nostril. If no response after 2-3 minutes, give another dose in the other nostril using a new spray.    NASAL DECONGESTANT SPRAY 0.05 % nasal spray No dose, route, or frequency recorded.    pantoprazole (PROTONIX) 40 mg tablet TAKE 1 TABLET BY MOUTH EVERY 12 HOURS    pregabalin (LYRICA) 100 mg, 2 times daily    [START ON 6/2/2025] QUEtiapine (SEROQUEL) 800 mg, Oral, Daily at bedtime    [START ON 6/2/2025] risperiDONE (RISPERDAL) 3 mg, Oral, Daily at bedtime    sucralfate (CARAFATE) 1 g tablet TAKE 1 TABLET BY MOUTH FOUR TIMES A DAY (BEFORE MEALS AND AT BEDTIME)    tadalafil (CIALIS) 20 mg, Oral    traMADol (ULTRAM) 50 mg tablet Take one to two as needed for moderate pain. Do not exceed 2 tabs a day    Triamcinolone Acetonide 55 MCG/ACT AERO ECHARSE 1 CAROLINA EN CADA FOSA NASAL DIARIAMENTE        Substance Abuse History:    Tobacco, Alcohol and Drug Use History     Tobacco Use    Smoking status: Never    Smokeless tobacco: Never   Substance Use Topics    Alcohol use: Never    Drug use: Not on file     Alcohol Use: Not At Risk (5/23/2019)    AUDIT-C     Frequency of Alcohol Consumption: Never       Social History:    Social History     Socioeconomic History    Marital status: Unknown     Spouse name: Not on file    Number  of children: Not on file    Years of education: Not on file    Highest education level: Not on file   Occupational History    Not on file   Other Topics Concern    Not on file   Social History Narrative    Lives with: alone    Marital status: never     Children: none    Siblings: 3 - 46M ([place]), 45F ([place]), 44M ([place])    Parents: mom is alive, father is diseased from     Education: Masters - Human Resources,     Employment: previously worked for [employer]), currently on disability    Ethnicity: [place], moved initially to [place] in 2016 and then to [place] in 2017, came alone at first, then sister came         Family Psychiatric History:     Family History   Problem Relation Age of Onset    Depression Mother     Anxiety disorder Mother     Schizoaffective Disorder  Mother     Depression Father     Colon cancer Father     Schizophrenia Sister     Drug abuse Brother     Drug abuse Brother     Depression Maternal Grandmother     Dementia Maternal Grandmother        Medical History Reviewed by provider this encounter:  Tobacco  Allergies  Meds  Problems  Med Hx  Surg Hx  Fam Hx          Objective   There were no vitals taken for this visit.     Mental Status Evaluation:    Appearance age appropriate, casually dressed   Behavior cooperative, calm   Speech normal rate, normal volume, normal pitch, spontaneous   Mood depressed   Affect constricted   Thought Processes organized, goal directed   Thought Content no overt delusions   Perceptual Disturbances: no auditory hallucinations, no visual hallucinations   Abnormal Thoughts  Risk Potential Suicidal ideation - None  Homicidal ideation - None  Potential for aggression - No   Orientation oriented to person, place, time/date, and situation   Memory recent and remote memory grossly intact   Consciousness alert and awake   Attention Span Concentration Span attention span and concentration are age appropriate   Intellect  appears to be of average intelligence   Insight intact   Judgement intact   Muscle Strength and  Gait normal muscle strength and normal muscle tone, normal gait and normal balance   Motor activity no abnormal movements   Language no difficulty naming common objects, no difficulty repeating a phrase, no difficulty writing a sentence   Fund of Knowledge adequate knowledge of current events  adequate fund of knowledge regarding past history  adequate fund of knowledge regarding vocabulary    Pain none   Pain Scale 0       Laboratory Results: I have personally reviewed all pertinent laboratory/tests results    Last Visit Labs:   Most Recent Labs:   Lab Results   Component Value Date    SODIUM 141 02/04/2025    K 4.4 02/04/2025     02/04/2025    CO2 27 02/04/2025    BUN 13 02/04/2025    CREATININE 1.23 02/04/2025    GLUC 109 (H) 02/04/2025    CALCIUM 9.4 02/04/2025    AST 18 02/04/2025    ALT 25 02/04/2025    ALKPHOS 51 02/04/2025    TP 7.7 02/04/2025    ALB 4.9 02/04/2025    TBILI 0.5 02/04/2025    FREET4 0.92 06/25/2018    HGBA1C 5.7 (H) 02/04/2025     02/04/2025     LIPID PANEL  Order: [id number]  Component  Ref Range & Units 2/4/25  3:00 PM   Cholesterol  <200 mg/dL 166   Triglycerides  <150 mg/dL 118   Cholesterol, HDL, Direct  23 - 92 mg/dL 53   Cholesterol, Non-HDL  <160 mg/dL 113   LDL Cholesterol  <130 mg/dL 89   Comment: LDL Cholesterol was calculated using the Friedewald equation. Direct measurement of LDL is not indicated for this patient based on [facility]'s analytical algorithm for measurement of LDL Cholesterol.   Chol/HDL Ratio 3.1     Specimen Collected: 02/04/25  3:00 PM    Performed by: [facility] CORE LAB ([id number]) Last Resulted: 02/04/25  6:57 PM   Received From: [facility]  Result Received: 02/05/25  7:54 AM            Suicide/Homicide Risk Assessment:    Risk of Harm to Self:  Based on today's assessment, Edward Armando presents the following risk of harm to self: low    Risk of  Harm to Others:  Based on today's assessment, Edward Armando presents the following risk of harm to others: low    The following interventions are recommended: Continue medication management. Continue psychotherapy. No other intervention changes indicated at this time.    Psychotherapy Provided:     Individual psychotherapy provided: No    Treatment Plan:    Completed and signed during the session: Not applicable - Treatment Plan not due at this session.    Goals: Progress towards Treatment Plan goals - Yes, moderate progress, as evidenced by subjective findings in HPI/Subjective Section and in Assessment and Plan Section    Depression Follow-up Plan Completed: Yes    Note Share:    This note was not shared with the patient due to reasonable likelihood of causing patient harm    Administrative Statements   Encounter provider Kaylyn Poe MD    The Patient is located at Home and in the following state in which I hold an active license [place].    The patient was identified by name and date of birth. Edward Wong was informed that this is a telemedicine visit and that the visit is being conducted through the Epic Embedded platform. He agrees to proceed..  My office door was closed. No one else was in the room.  He acknowledged consent and understanding of privacy and security of the video platform. The patient has agreed to participate and understands they can discontinue the visit at any time.    I have spent a total time of 15 minutes in caring for this patient on the day of the visit/encounter including Prognosis, Risks and benefits of tx options, Instructions for management, Patient and family education, Importance of tx compliance, Risk factor reductions, Impressions, Counseling / Coordination of care, Documenting in the medical record, Reviewing/placing orders in the medical record (including tests, medications, and/or procedures), and Obtaining or reviewing history  , not including the time spent for  establishing the audio/video connection.    Visit Time  Visit Start Time: 3:00 pm  Visit Stop Time: 3:15 pm  Total Visit Duration:  15 minutes    Kaylyn Poe MD 04/18/25

## 2025-04-18 NOTE — ASSESSMENT & PLAN NOTE
Continue Risperdal 3 mg nightly  Continue Seroquel 800 mg nightly               - Currently on dual antipsychotic therapy due to failure with monotherapy; benefit of being on dual antipsychotics outweighs the risks. Has been tolerating this regimen well.                - Metabolic labs: on statin; lipid panel on 2/4/25 wnl, A1c 5.7% on 2/4/25  - AIMS: 0 on 4/18/25  Continue Cogentin 2 mg twice daily for EPS  Continue Wellbutrin  mg daily               - CYP 2D6 inhibition may lead to increased dose of Risperdal, patient understands risks and would like to continue previous regimen  Continue Gabapentin 800 mg 4 times daily    Orders:    buPROPion (WELLBUTRIN SR) 200 MG 12 hr tablet; Take 1 tablet (200 mg total) by mouth in the morning Do not start before June 2, 2025.    QUEtiapine (SEROquel) 400 MG tablet; Take 2 tablets (800 mg total) by mouth daily at bedtime Do not start before June 2, 2025.    risperiDONE (RisperDAL) 3 mg tablet; Take 1 tablet (3 mg total) by mouth daily at bedtime Do not start before June 2, 2025.

## 2025-04-21 ENCOUNTER — TELEPHONE (OUTPATIENT)
Dept: PSYCHIATRY | Facility: CLINIC | Age: 45
End: 2025-04-21

## 2025-04-21 ENCOUNTER — TELEPHONE (OUTPATIENT)
Age: 45
End: 2025-04-21

## 2025-04-21 NOTE — TELEPHONE ENCOUNTER
Patient called in requesting a call back from their therapist.    The best call back number for the patient is [phone]

## 2025-04-21 NOTE — TELEPHONE ENCOUNTER
Left voicemail informing patient and/or parent/guardian of the Psych Encounter form needing to be signed as a requirement from the insurance company for billing purposes. Patient can access form via [employer] and sign electronically.     Please make patient aware this form must be signed for each visit as a requirement to continue future visits with provider.    Virtual Encounter form 4/16/25 call #1  
There is 1 Wet Read(s) to document.

## 2025-04-23 ENCOUNTER — TELEPHONE (OUTPATIENT)
Dept: PSYCHIATRY | Facility: CLINIC | Age: 45
End: 2025-04-23

## 2025-04-23 ENCOUNTER — PATIENT OUTREACH (OUTPATIENT)
Dept: SURGERY | Facility: CLINIC | Age: 45
End: 2025-04-23

## 2025-04-23 NOTE — TELEPHONE ENCOUNTER
Spoke with patient will sign off on 4/18/25 Virtual Encounter forms in next office visit call #1 4/23/25

## 2025-04-24 ENCOUNTER — PATIENT OUTREACH (OUTPATIENT)
Dept: SURGERY | Facility: CLINIC | Age: 45
End: 2025-04-24

## 2025-04-25 ENCOUNTER — TELEPHONE (OUTPATIENT)
Dept: PSYCHIATRY | Facility: CLINIC | Age: 45
End: 2025-04-25

## 2025-04-25 ENCOUNTER — TELEPHONE (OUTPATIENT)
Dept: BEHAVIORAL/MENTAL HEALTH CLINIC | Facility: CLINIC | Age: 45
End: 2025-04-25

## 2025-04-25 ENCOUNTER — PATIENT OUTREACH (OUTPATIENT)
Dept: SURGERY | Facility: CLINIC | Age: 45
End: 2025-04-25

## 2025-04-25 NOTE — TELEPHONE ENCOUNTER
Left voicemail informing patient and/or parent/guardian of the Psych Encounter form needing to be signed as a requirement from the insurance company for billing purposes. Patient can access form via [employer] and sign electronically.     Please make patient aware this form must be signed for each visit as a requirement to continue future visits with provider.

## 2025-04-25 NOTE — TELEPHONE ENCOUNTER
Unable to leave voicemail informing patient of the Psych Encounter form needing to be signed as a requirement from the insurance company for billing purposes. If patients contacts office, please make patient aware that the form can be accessed via [employer] to sign electronically and must be signed for each visit as a requirement to continue future visits with provider.    Patient needs translater.

## 2025-04-28 ENCOUNTER — PATIENT OUTREACH (OUTPATIENT)
Dept: SURGERY | Facility: CLINIC | Age: 45
End: 2025-04-28

## 2025-04-29 ENCOUNTER — PATIENT OUTREACH (OUTPATIENT)
Dept: SURGERY | Facility: CLINIC | Age: 45
End: 2025-04-29

## 2025-04-30 ENCOUNTER — PATIENT OUTREACH (OUTPATIENT)
Dept: SURGERY | Facility: CLINIC | Age: 45
End: 2025-04-30

## 2025-05-01 ENCOUNTER — TELEMEDICINE (OUTPATIENT)
Dept: BEHAVIORAL/MENTAL HEALTH CLINIC | Facility: CLINIC | Age: 45
End: 2025-05-01
Payer: MEDICARE

## 2025-05-01 DIAGNOSIS — F31.4 BIPOLAR 1 DISORDER, DEPRESSED, SEVERE (HCC): Primary | ICD-10-CM

## 2025-05-01 DIAGNOSIS — F41.1 GENERALIZED ANXIETY DISORDER: ICD-10-CM

## 2025-05-01 DIAGNOSIS — F33.3 MAJOR DEPRESSIVE DISORDER, RECURRENT, SEVERE WITH PSYCHOTIC FEATURES (HCC): ICD-10-CM

## 2025-05-01 PROCEDURE — 90834 PSYTX W PT 45 MINUTES: CPT | Performed by: COUNSELOR

## 2025-05-01 NOTE — PSYCH
Virtual Regular VisitName: Edward Wong      : 1980      MRN: [id number]  Encounter Provider: ROSIE Peace  Encounter Date: 2025   Encounter department: [facility] [address]  :  Assessment & Plan  Bipolar 1 disorder, depressed, severe (HCC)         Generalized anxiety disorder         Major depressive disorder, recurrent, severe with psychotic features (HCC)             History of Present Illness     HPI  Review of Systems    Objective   There were no vitals taken for this visit.    Physical Exam    Administrative Statements   Encounter provider ROSIE Peace    The Patient is located at Home and in the following state in which I hold an active license [place].    The patient was identified by name and date of birth. Edward Wong was informed that this is a telemedicine visit and that the visit is being conducted through the Epic Embedded platform. He agrees to proceed..  My office door was closed. No one else was in the room.  He acknowledged consent and understanding of privacy and security of the video platform. The patient has agreed to participate and understands they can discontinue the visit at any time.    I have spent a total time of 50 minutes in caring for this patient on the day of the visit/encounter including Counseling / Coordination of care, not including the time spent for establishing the audio/video connection.    Behavioral Health Psychotherapy Progress Note    Psychotherapy Provided: Individual Psychotherapy     1. Bipolar 1 disorder, depressed, severe (HCC)        2. Generalized anxiety disorder        3. Major depressive disorder, recurrent, severe with psychotic features (HCC)            Goals addressed in session: Goal 1 and Goal 2     DATA:     LPC conducted an individual therapy session to assess moods, behaviors and overall functioning. Started session with initial check in. Reviewed stressors. Revised precipitating stressors  "impacting Edward's functioning including health concerning factos, family matters and other environmental stressors. Edward and therapist explored impacted of current depressive and anxiety symptoms including identifying any evident or presumed secondary gains related to those stressors. Edward reported concerns on a recent pre diabetic condition stating \"I did not think about it much\" however presented with incongruent responses \"well, I think I should be worrying more\" in regards him minimizing the effects of unhealthy lifestyle habits on his health. Reported paying almost 50 dollars monthly on a gym. Therapist challenged distorted thoughts by assigning homework on attending to gym at least 2x week to help decrease iner tension and also making real changes on his health. Edward asked therapist to check on future appointments.    During this session, this clinician used the following therapeutic modalities: Engagement Strategies, Cognitive Behavioral Therapy, Cognitive Processing Therapy, Dialectical Behavior Therapy, Solution-Focused Therapy, and Supportive Psychotherapy    Substance Abuse was not addressed during this session. If the client is diagnosed with a co-occurring substance use disorder, please indicate any changes in the frequency or amount of use: . Stage of change for addressing substance use diagnoses: No substance use/Not applicable    ASSESSMENT:  Edward Wong presents with a Euthymic/ normal, Angry, Dysthymic, and Depressed mood.     his affect is Normal range and intensity, which is congruent, with his mood and the content of the session. The client has not made progress on their goals.     Edward Wong presents with a none risk of suicide, none risk of self-harm, and none risk of harm to others.    For any risk assessment that surpasses a \"low\" rating, a safety plan must be developed.    A safety plan was indicated: no  If yes, describe in detail     PLAN: Between " sessions, Edward Wong will engage in pleasant recreational activities to decrease maladaptive moods and behaviors.. At the next session, the therapist will use Engagement Strategies, Client-centered Therapy, Cognitive Behavioral Therapy, Cognitive Processing Therapy, Solution-Focused Therapy, and Supportive Psychotherapy to address stressors.    Behavioral Health Treatment Plan and Discharge Planning: Edward Wong is aware of and agrees to continue to work on their treatment plan. They have identified and are working toward their discharge goals. yes    Depression Follow-up Plan Completed: No    Visit start and stop times:    05/01/25  Start Time: 0100  Stop Time: 0150  Total Visit Time: 50 minutes

## 2025-05-02 ENCOUNTER — PATIENT OUTREACH (OUTPATIENT)
Dept: SURGERY | Facility: CLINIC | Age: 45
End: 2025-05-02

## 2025-05-06 ENCOUNTER — TELEPHONE (OUTPATIENT)
Dept: PSYCHIATRY | Facility: CLINIC | Age: 45
End: 2025-05-06

## 2025-05-06 NOTE — TELEPHONE ENCOUNTER
Unable to leave voicemail informing patient of the Psych Encounter form needing to be signed as a requirement from the insurance company for billing purposes. If patients contacts office, please make patient aware that the form can be accessed via [employer] to sign electronically and must be signed for each visit as a requirement to continue future visits with provider.    Patient needs a translater

## 2025-05-08 ENCOUNTER — TELEPHONE (OUTPATIENT)
Dept: BEHAVIORAL/MENTAL HEALTH CLINIC | Facility: CLINIC | Age: 45
End: 2025-05-08

## 2025-05-08 NOTE — TELEPHONE ENCOUNTER
Left voicemail informing patient and/or parent/guardian of the Psych Encounter form needing to be signed as a requirement from the insurance company for billing purposes. Patient can access form via [employer] and sign electronically.     Please make patient aware this form must be signed for each visit as a requirement to continue future visits with provider.

## 2025-05-12 ENCOUNTER — TELEPHONE (OUTPATIENT)
Dept: PSYCHIATRY | Facility: CLINIC | Age: 45
End: 2025-05-12

## 2025-05-12 NOTE — TELEPHONE ENCOUNTER
Unable to leave voicemail informing patient of the Psych Encounter form needing to be signed as a requirement from the insurance company for billing purposes. If patients contacts office, please make patient aware that the form can be accessed via [employer] to sign electronically and must be signed for each visit as a requirement to continue future visits with provider.

## 2025-05-12 NOTE — TELEPHONE ENCOUNTER
Unable to leave voicemail informing patient of the Psych Encounter form needing to be signed as a requirement from the insurance company for billing purposes. If patients contacts office, please make patient aware that the form can be accessed via [employer] to sign electronically and must be signed for each visit as a requirement to continue future visits with provider.    Patient needs translater

## 2025-05-13 ENCOUNTER — TELEMEDICINE (OUTPATIENT)
Dept: BEHAVIORAL/MENTAL HEALTH CLINIC | Facility: CLINIC | Age: 45
End: 2025-05-13
Payer: MEDICARE

## 2025-05-13 DIAGNOSIS — F41.1 GENERALIZED ANXIETY DISORDER: Primary | ICD-10-CM

## 2025-05-13 DIAGNOSIS — F33.3 MAJOR DEPRESSIVE DISORDER, RECURRENT, SEVERE WITH PSYCHOTIC FEATURES (HCC): ICD-10-CM

## 2025-05-13 DIAGNOSIS — F31.4 BIPOLAR 1 DISORDER, DEPRESSED, SEVERE (HCC): ICD-10-CM

## 2025-05-13 PROCEDURE — 90834 PSYTX W PT 45 MINUTES: CPT | Performed by: COUNSELOR

## 2025-05-13 NOTE — PSYCH
Virtual Regular VisitName: Edward Wong      : 1980      MRN: [id number]  Encounter Provider: ROSIE Peace  Encounter Date: 2025   Encounter department: [facility] [address]  :  Assessment & Plan  Generalized anxiety disorder         Bipolar 1 disorder, depressed, severe (HCC)         Major depressive disorder, recurrent, severe with psychotic features (HCC)             History of Present Illness     HPI  Review of Systems    Objective   There were no vitals taken for this visit.    Physical Exam    Administrative Statements   Encounter provider ROSIE Peace    The Patient is located at Home and in the following state in which I hold an active license [place].    The patient was identified by name and date of birth. Edward Wong was informed that this is a telemedicine visit and that the visit is being conducted through the Epic Embedded platform. He agrees to proceed..  My office door was closed. No one else was in the room.  He acknowledged consent and understanding of privacy and security of the video platform. The patient has agreed to participate and understands they can discontinue the visit at any time.    I have spent a total time of 50 minutes in caring for this patient on the day of the visit/encounter including Counseling / Coordination of care, not including the time spent for establishing the audio/video connection.    Behavioral Health Psychotherapy Progress Note    Psychotherapy Provided: Individual Psychotherapy     1. Generalized anxiety disorder        2. Bipolar 1 disorder, depressed, severe (HCC)        3. Major depressive disorder, recurrent, severe with psychotic features (HCC)            Goals addressed in session: Goal 1 and Goal 2     DATA:     [facility] conducted an individual therapy session to assess moods, behaviors and overall functioning. Started session with initial check in.  Revised precipitating stressors impacting Harjit  "functioning including socioeconomical and environmental factors. Edward and therapist worked on exploring underneath symptoms and emotional responses linked with symptoms. Edward reported concerns on a recent pre diabetic condition stating \"I did not think about it much\" however presented with incongruent responses \"well, I think I should be worrying more\" in regards him minimizing the effects of unhealthy lifestyle habits on his health. Therapist challenged distorted thoughts by assigning homework on attending to gym at least 2x week to help decrease iner tension and also making real changes on his health.     During this session, this clinician used the following therapeutic modalities: Engagement Strategies, Cognitive Behavioral Therapy, Cognitive Processing Therapy, Dialectical Behavior Therapy, Solution-Focused Therapy, and Supportive Psychotherapy    Substance Abuse was not addressed during this session. If the client is diagnosed with a co-occurring substance use disorder, please indicate any changes in the frequency or amount of use: . Stage of change for addressing substance use diagnoses: No substance use/Not applicable    ASSESSMENT:  Edward Wong presents with a Euthymic/ normal, Angry, Dysthymic, and Depressed mood.     his affect is Normal range and intensity, which is congruent, with his mood and the content of the session. The client has not made progress on their goals.     Edward Wong presents with a none risk of suicide, none risk of self-harm, and none risk of harm to others.    For any risk assessment that surpasses a \"low\" rating, a safety plan must be developed.    A safety plan was indicated: no  If yes, describe in detail     PLAN: Between sessions, Edward Wong will engage in pleasant recreational activities to decrease maladaptive moods and behaviors.. At the next session, the therapist will use Engagement Strategies, Client-centered Therapy, " Cognitive Behavioral Therapy, Cognitive Processing Therapy, Solution-Focused Therapy, and Supportive Psychotherapy to address stressors.    Behavioral Health Treatment Plan and Discharge Planning: Edward Wong is aware of and agrees to continue to work on their treatment plan. They have identified and are working toward their discharge goals. yes    Depression Follow-up Plan Completed: No    Visit start and stop times:    05/13/25  Start Time: 0100  Stop Time: 0145  Total Visit Time: 45 minutes

## 2025-05-15 ENCOUNTER — PATIENT OUTREACH (OUTPATIENT)
Dept: SURGERY | Facility: CLINIC | Age: 45
End: 2025-05-15

## 2025-05-16 ENCOUNTER — TELEPHONE (OUTPATIENT)
Dept: PSYCHIATRY | Facility: CLINIC | Age: 45
End: 2025-05-16

## 2025-05-16 ENCOUNTER — PATIENT OUTREACH (OUTPATIENT)
Dept: SURGERY | Facility: CLINIC | Age: 45
End: 2025-05-16

## 2025-05-16 NOTE — TELEPHONE ENCOUNTER
Spoke with patient gave verbal consent to sign off on 4/3/25 5/1/25 5/13/25 Virtual Encounter forms 5/16/25

## 2025-05-20 ENCOUNTER — PATIENT OUTREACH (OUTPATIENT)
Dept: SURGERY | Facility: CLINIC | Age: 45
End: 2025-05-20

## 2025-05-21 ENCOUNTER — PATIENT OUTREACH (OUTPATIENT)
Dept: SURGERY | Facility: CLINIC | Age: 45
End: 2025-05-21

## 2025-05-22 ENCOUNTER — PATIENT OUTREACH (OUTPATIENT)
Dept: SURGERY | Facility: CLINIC | Age: 45
End: 2025-05-22

## 2025-05-30 ENCOUNTER — PATIENT OUTREACH (OUTPATIENT)
Dept: SURGERY | Facility: CLINIC | Age: 45
End: 2025-05-30

## 2025-06-02 ENCOUNTER — PATIENT OUTREACH (OUTPATIENT)
Dept: SURGERY | Facility: CLINIC | Age: 45
End: 2025-06-02

## 2025-06-02 ENCOUNTER — TELEPHONE (OUTPATIENT)
Age: 45
End: 2025-06-02

## 2025-06-02 NOTE — TELEPHONE ENCOUNTER
Patient called and requested provider callback. He reported it is regarding SSI appeal. Please call back.

## 2025-06-03 ENCOUNTER — TELEMEDICINE (OUTPATIENT)
Dept: BEHAVIORAL/MENTAL HEALTH CLINIC | Facility: CLINIC | Age: 45
End: 2025-06-03
Payer: MEDICARE

## 2025-06-03 DIAGNOSIS — F43.10 PTSD (POST-TRAUMATIC STRESS DISORDER): Primary | ICD-10-CM

## 2025-06-03 DIAGNOSIS — F33.3 MAJOR DEPRESSIVE DISORDER, RECURRENT, SEVERE WITH PSYCHOTIC FEATURES (HCC): ICD-10-CM

## 2025-06-03 PROCEDURE — 90834 PSYTX W PT 45 MINUTES: CPT | Performed by: COUNSELOR

## 2025-06-05 ENCOUNTER — PATIENT OUTREACH (OUTPATIENT)
Dept: SURGERY | Facility: CLINIC | Age: 45
End: 2025-06-05

## 2025-06-05 ENCOUNTER — TELEPHONE (OUTPATIENT)
Dept: BEHAVIORAL/MENTAL HEALTH CLINIC | Facility: CLINIC | Age: 45
End: 2025-06-05

## 2025-06-05 NOTE — TELEPHONE ENCOUNTER
Spoke to patient and/or parent/guardian to make aware of the Psych Encounter form needing to be signed from recent completed appointment(s).  Visit 4/16/25- Virtual.

## 2025-06-11 NOTE — PSYCH
Virtual Regular Visit  Name: Edward Wong      : 1980      MRN: [id number]  Encounter Provider: ROSIE Peace  Encounter Date: 6/3/2025   Encounter department: [facility] [address]  :  Assessment & Plan  PTSD (post-traumatic stress disorder)         Major depressive disorder, recurrent, severe with psychotic features (HCC)             History of Present Illness     HPI  Review of Systems    Objective   There were no vitals taken for this visit.    Physical Exam    Administrative Statements   Encounter provider ROSIE Peace    The Patient is located at Home and in the following state in which I hold an active license [place].    The patient was identified by name and date of birth. Edward Wong was informed that this is a telemedicine visit and that the visit is being conducted through the Epic Embedded platform. He agrees to proceed..  My office door was closed. No one else was in the room.  He acknowledged consent and understanding of privacy and security of the video platform. The patient has agreed to participate and understands they can discontinue the visit at any time.    I have spent a total time of 40 minutes in caring for this patient on the day of the visit/encounter including Counseling / Coordination of care, not including the time spent for establishing the audio/video connection.    Behavioral Health Psychotherapy Progress Note    Psychotherapy Provided: Individual Psychotherapy     1. PTSD (post-traumatic stress disorder)        2. Major depressive disorder, recurrent, severe with psychotic features (HCC)            Goals addressed in session: Goal 1 and Goal 2     DATA:     [facility] conducted an individual therapy session to assess moods, behaviors and overall functioning. Started session with initial check in. Reviewed stressors. Revised precipitating stressors impacting Donaldos functioning including health concerning factos, family matters and  "other environmental stressors. Edward and therapist explored impacted of current depressive and anxiety symptoms including identifying any evident or presumed secondary gains related to those stressors. Edward reported concerns on a recent stressors linked with his SSI benefits. Shared worries on receiving a letter from his  which he does not understand in ref. To his SSI benefits. Encouraged to reach out using his advocacy skills to communicate his needs and wants.Therapist challenged distorted thoughts by assigning homework on attending to gym at least 2x week to help decrease iner tension and also making real changes on his health. Edward asked therapist to check on future appointments.      During this session, this clinician used the following therapeutic modalities: Engagement Strategies, Cognitive Behavioral Therapy, Cognitive Processing Therapy, Dialectical Behavior Therapy, Mindfulness-based Strategies, Solution-Focused Therapy, and Supportive Psychotherapy    Substance Abuse was not addressed during this session. If the client is diagnosed with a co-occurring substance use disorder, please indicate any changes in the frequency or amount of use: . Stage of change for addressing substance use diagnoses: No substance use/Not applicable    ASSESSMENT:  Edward Wong presents with a Euthymic/ normal, Dysthymic, and Depressed mood.     his affect is Normal range and intensity, Bright, and Constricted, which is congruent, with his mood and the content of the session. The client has not made progress on their goals.     Edward Wong presents with a none risk of suicide, none risk of self-harm, and none risk of harm to others.    For any risk assessment that surpasses a \"low\" rating, a safety plan must be developed.    A safety plan was indicated: no  If yes, describe in detail     PLAN: Between sessions, Edward Wong will reaching out to  to advocate for self. " At the next session, the therapist will use Engagement Strategies, Cognitive Behavioral Therapy, Cognitive Processing Therapy, Dialectical Behavior Therapy, Solution-Focused Therapy, and Supportive Psychotherapy to address stressors.    Behavioral Health Treatment Plan and Discharge Planning: Edward Wong is aware of and agrees to continue to work on their treatment plan. They have identified and are working toward their discharge goals. no    Depression Follow-up Plan Completed: No    Visit start and stop times:    06/03/25  Start Time: 0200  Stop Time: 0240  Total Visit Time: 40 minutes

## 2025-06-12 ENCOUNTER — SOCIAL WORK (OUTPATIENT)
Dept: BEHAVIORAL/MENTAL HEALTH CLINIC | Facility: CLINIC | Age: 45
End: 2025-06-12
Payer: MEDICARE

## 2025-06-12 DIAGNOSIS — F33.3 MAJOR DEPRESSIVE DISORDER, RECURRENT, SEVERE WITH PSYCHOTIC FEATURES (HCC): ICD-10-CM

## 2025-06-12 DIAGNOSIS — F43.10 PTSD (POST-TRAUMATIC STRESS DISORDER): ICD-10-CM

## 2025-06-12 DIAGNOSIS — F41.1 GENERALIZED ANXIETY DISORDER: Primary | ICD-10-CM

## 2025-06-12 PROCEDURE — 90832 PSYTX W PT 30 MINUTES: CPT | Performed by: COUNSELOR

## 2025-06-12 NOTE — PSYCH
Behavioral Health Psychotherapy Progress Note    Psychotherapy Provided: Individual Psychotherapy     1. Generalized anxiety disorder        2. Major depressive disorder, recurrent, severe with psychotic features (HCC)        3. PTSD (post-traumatic stress disorder)            Goals addressed in session: Goal 1 and Goal 2     DATA:     [facility] conducted an individual therapy session to assess moods, behaviors and overall functioning. Started session with initial check in. Edward struggles managing stressors around a legal situation with SSDI. Experiencing massive worries, trouble sleeping, mood fluctuations and intrusive thoughts appearing confused on a letter he received from a . Therapist assisted processing and venting uncomfortable emotions while provided with resources. Assisted Edward reaching out to  to clarify on his questions while advise/encourage to advocate for his needs and wants. Th.praised for courage and determination on pursuing a phone meeting with  despite his language barrier.      During this session, this clinician used the following therapeutic modalities: Engagement Strategies, Client-centered Therapy, Cognitive Behavioral Therapy, Cognitive Processing Therapy, Dialectical Behavior Therapy, Solution-Focused Therapy, and Supportive Psychotherapy    Substance Abuse was not addressed during this session. If the client is diagnosed with a co-occurring substance use disorder, please indicate any changes in the frequency or amount of use: . Stage of change for addressing substance use diagnoses: No substance use/Not applicable    ASSESSMENT:  Edward Wong presents with a Euthymic/ normal, Anxious, and Dysthymic mood.     his affect is Normal range and intensity, Bright, and Constricted, which is congruent, with his mood and the content of the session. The client has not made progress on their goals.     Edward Wong presents with a none risk of suicide,  "none risk of self-harm, and none risk of harm to others.    For any risk assessment that surpasses a \"low\" rating, a safety plan must be developed.    A safety plan was indicated: no  If yes, describe in detail     PLAN: Between sessions, Edward Wong will continue advocate for self. At the next session, the therapist will use Bereavement Therapy, Client-centered Therapy, Cognitive Behavioral Therapy, Cognitive Processing Therapy, Solution-Focused Therapy, and Supportive Psychotherapy to address stressors.    Behavioral Health Treatment Plan and Discharge Planning: Edward Wong is aware of and agrees to continue to work on their treatment plan. They have identified and are working toward their discharge goals. yes    Depression Follow-up Plan Completed: No    Visit start and stop times:    06/12/25    /Start Time: 0200  Stop Time: 0237  Total Visit Time: 37 minutes  "

## 2025-06-23 ENCOUNTER — TELEPHONE (OUTPATIENT)
Age: 45
End: 2025-06-23

## 2025-06-24 ENCOUNTER — TELEPHONE (OUTPATIENT)
Dept: BEHAVIORAL/MENTAL HEALTH CLINIC | Facility: CLINIC | Age: 45
End: 2025-06-24

## 2025-06-24 ENCOUNTER — TELEPHONE (OUTPATIENT)
Age: 45
End: 2025-06-24

## 2025-06-26 ENCOUNTER — PATIENT OUTREACH (OUTPATIENT)
Dept: SURGERY | Facility: CLINIC | Age: 45
End: 2025-06-26

## 2025-06-26 ENCOUNTER — TELEPHONE (OUTPATIENT)
Age: 45
End: 2025-06-26

## 2025-06-27 DIAGNOSIS — F41.1 GENERALIZED ANXIETY DISORDER WITH PANIC ATTACKS: ICD-10-CM

## 2025-06-27 DIAGNOSIS — F31.4 BIPOLAR 1 DISORDER, DEPRESSED, SEVERE (HCC): ICD-10-CM

## 2025-06-27 DIAGNOSIS — F41.0 GENERALIZED ANXIETY DISORDER WITH PANIC ATTACKS: ICD-10-CM

## 2025-06-27 NOTE — TELEPHONE ENCOUNTER
Pharmacy requesting new prescription patient out of medications     Reason for call:   [x] Refill   [] Prior Auth  [] Other:     Office:   [] PCP/Provider -   [x] Specialty/Provider - Kaylyn Perera    Medication: Gabapentin    Dose/Frequency: 800 mg QID    Quantity: 120    Pharmacy: [facility] [address] [facility]     Local Pharmacy   Does the patient have enough for 3 days?   [] Yes   [x] No - Send as HP to POD    Mail Away Pharmacy   Does the patient have enough for 10 days?   [] Yes   [] No - Send as HP to POD

## 2025-06-30 ENCOUNTER — PATIENT OUTREACH (OUTPATIENT)
Dept: SURGERY | Facility: CLINIC | Age: 45
End: 2025-06-30

## 2025-06-30 RX ORDER — GABAPENTIN 800 MG/1
800 TABLET ORAL 4 TIMES DAILY
Qty: 120 TABLET | Refills: 5 | Status: SHIPPED | OUTPATIENT
Start: 2025-06-30

## 2025-07-01 ENCOUNTER — TELEMEDICINE (OUTPATIENT)
Dept: BEHAVIORAL/MENTAL HEALTH CLINIC | Facility: CLINIC | Age: 45
End: 2025-07-01
Payer: MEDICARE

## 2025-07-01 DIAGNOSIS — F41.1 GENERALIZED ANXIETY DISORDER: Primary | ICD-10-CM

## 2025-07-01 DIAGNOSIS — F33.3 MAJOR DEPRESSIVE DISORDER, RECURRENT, SEVERE WITH PSYCHOTIC FEATURES (HCC): ICD-10-CM

## 2025-07-01 DIAGNOSIS — F43.10 PTSD (POST-TRAUMATIC STRESS DISORDER): ICD-10-CM

## 2025-07-01 PROCEDURE — 90834 PSYTX W PT 45 MINUTES: CPT | Performed by: COUNSELOR

## 2025-07-01 NOTE — BH TREATMENT PLAN
Outpatient Behavioral Health Psychotherapy Treatment Plan    Edward Wong  1980     Date of Initial Psychotherapy Assessment: 10/24/24  /Date of Current Treatment Plan: 07/01/25  Treatment Plan Target Date:01/01/26  Treatment Plan Expiration Date: TBD    Diagnosis:   1. Generalized anxiety disorder        2. Major depressive disorder, recurrent, severe with psychotic features (HCC)        3. PTSD (post-traumatic stress disorder)            Area(s) of Need: anxiety, depression, mood swings  Strengths:  taking medications as prescribed, ability to communicate needs, ability to communicate well, ability to understand psychiatric illness, average or above intelligence, general fund of knowledge. Close to maternal figure, resilient.     Long Term Goal 1 (in the client's own words): Stabilize depressive and anxiety symptoms learning effective coping skills  07/01/25: Edward experiences bouts of anxiety and depression on daily basis.     Stage of Change: Preparation    Target Date for completion: 01/01/26     Anticipated therapeutic modalities: mindfulness, calming strategies, nature walks, talk therapy, CBT, DBT, thought stopping, solution focused, somatic therapy, creative art expression, feeling expression, self monitoring, goal setting, stress management, conflict resolution, Self care modalities, Reframing, cognitive restructuring, assertive communication.      People identified to complete this goal: Edward and Clinician Ninfa Beaver MA, LPC      Objective 1: (identify the means of measuring success in meeting the objective): I will identify at least 3 new distress tolerance skills to manage every day stressors      Objective 2: (identify the means of measuring success in meeting the objective): I want to become more aware of my emotions and reduce depression           Objective 3:(identify the means of measuring success in meeting the objective): Provide psychoeducation on what is anxiety,  "causes and triggers.     I am currently under the care of a [facility] psychiatric provider: yes    My [facility] psychiatric provider is: MD Lui    I am currently taking psychiatric medications: Yes, as prescribed    I feel that I will be ready for discharge from mental health care when I reach the following (measurable goal/objective): \" indefinitely. I do not want to lose my benefits\"    For children and adults who have a legal guardian:   Has there been any change to custody orders and/or guardianship status? Yes. If yes, attach updated documentation.    I have created my Crisis Plan and have been offered a copy of this plan    Behavioral Health Treatment Plan [facility]: Diagnosis and Treatment Plan explained to Edward Wong acknowledges an understanding of their diagnosis. Edward Wong agrees to this treatment plan.    I have been offered a copy of this Treatment Plan. yes        "

## 2025-07-01 NOTE — PSYCH
Virtual Regular Visit  Name: Edward Wong      : 1980      MRN: [id number]  Encounter Provider: ROSIE Peace  Encounter Date: 2025   Encounter department: [facility] [address]  :  Assessment & Plan  Generalized anxiety disorder         Major depressive disorder, recurrent, severe with psychotic features (HCC)         PTSD (post-traumatic stress disorder)             History of Present Illness     HPI  Review of Systems    Objective   There were no vitals taken for this visit.    Physical Exam    Administrative Statements   Encounter provider ROSIE Peace    The Patient is located at Home and in the following state in which I hold an active license [place].    The patient was identified by name and date of birth. Edward Wong was informed that this is a telemedicine visit and that the visit is being conducted through the Epic Embedded platform. He agrees to proceed..  My office door was closed. No one else was in the room.  He acknowledged consent and understanding of privacy and security of the video platform. The patient has agreed to participate and understands they can discontinue the visit at any time.    I have spent a total time of 46 minutes in caring for this patient on the day of the visit/encounter including Counseling / Coordination of care, not including the time spent for establishing the audio/video connection.    Behavioral Health Psychotherapy Progress Note    Psychotherapy Provided: Individual Psychotherapy     1. Generalized anxiety disorder        2. Major depressive disorder, recurrent, severe with psychotic features (HCC)        3. PTSD (post-traumatic stress disorder)            Goals addressed in session: Goal 1 and Goal 2     DATA:     LPC conducted an individual therapy session to assess moods, behaviors and overall functioning. Started session with initial check in. Utilized narrative therapy for storytelling, CBT and cultural  "adapted DBT to explore stressors, identify areas of needs and assess symptoms.  Edward shared memorable experiences triggering pleasant mixed emotions recalling [place] expressing frustrations on his family limited resources. Therapist carefully and compassionately listened.  ANTONELLA shared meaningful stories of resilience throughout that has impacted his outlook of life, his today choices and lifestyle. Ended by expressing gratitude for what h has and he is. He seems committed to practice gratitude on daily basis to decrease depressive symptoms, loneliness and ambiguous emotions. Clinician ended offering a culturally respectful approach for engagement and assess his unique psychological developmental needs.     During this session, this clinician used the following therapeutic modalities: Engagement Strategies, Client-centered Therapy, Cognitive Behavioral Therapy, Cognitive Processing Therapy, Dialectical Behavior Therapy, Mindfulness-based Strategies, Solution-Focused Therapy, and Supportive Psychotherapy    Substance Abuse was not addressed during this session. If the client is diagnosed with a co-occurring substance use disorder, please indicate any changes in the frequency or amount of use: . Stage of change for addressing substance use diagnoses: No substance use/Not applicable    ASSESSMENT:  Edward Wong presents with a Euthymic/ normal, Anxious, and Dysthymic mood.     his affect is Normal range and intensity, Bright, and Constricted, which is congruent, with his mood and the content of the session. The client has not made progress on their goals.     Edward Wong presents with a none risk of suicide, none risk of self-harm, and none risk of harm to others.    For any risk assessment that surpasses a \"low\" rating, a safety plan must be developed.    A safety plan was indicated: no  If yes, describe in detail     PLAN: Between sessions, Edward Armando " Judith will practice gratitude 3x day. At the next session, the therapist will use Engagement Strategies, Client-centered Therapy, Cognitive Behavioral Therapy, Cognitive Processing Therapy, Dialectical Behavior Therapy, Mindfulness-based Strategies, and Music Therapy Techniques to address stressors.    Behavioral Health Treatment Plan and Discharge Planning: Edward Wong is aware of and agrees to continue to work on their treatment plan. They have identified and are working toward their discharge goals. no    Depression Follow-up Plan Completed: No    Visit start and stop times:    07/01/25  Start Time: 0200  Stop Time: 0246  Total Visit Time: 46 minutes

## 2025-07-02 ENCOUNTER — PATIENT OUTREACH (OUTPATIENT)
Dept: SURGERY | Facility: CLINIC | Age: 45
End: 2025-07-02

## 2025-07-10 ENCOUNTER — TELEMEDICINE (OUTPATIENT)
Dept: BEHAVIORAL/MENTAL HEALTH CLINIC | Facility: CLINIC | Age: 45
End: 2025-07-10
Payer: MEDICARE

## 2025-07-10 DIAGNOSIS — F41.1 GENERALIZED ANXIETY DISORDER: Primary | ICD-10-CM

## 2025-07-10 DIAGNOSIS — F43.10 PTSD (POST-TRAUMATIC STRESS DISORDER): ICD-10-CM

## 2025-07-10 DIAGNOSIS — F33.3 MAJOR DEPRESSIVE DISORDER, RECURRENT, SEVERE WITH PSYCHOTIC FEATURES (HCC): ICD-10-CM

## 2025-07-10 PROCEDURE — 90834 PSYTX W PT 45 MINUTES: CPT | Performed by: COUNSELOR
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
## 2025-07-14 ENCOUNTER — PATIENT OUTREACH (OUTPATIENT)
Dept: SURGERY | Facility: CLINIC | Age: 45
End: 2025-07-14

## 2025-07-17 ENCOUNTER — PATIENT OUTREACH (OUTPATIENT)
Dept: SURGERY | Facility: CLINIC | Age: 45
End: 2025-07-17

## 2025-07-24 ENCOUNTER — TELEMEDICINE (OUTPATIENT)
Dept: BEHAVIORAL/MENTAL HEALTH CLINIC | Facility: CLINIC | Age: 45
End: 2025-07-24
Payer: MEDICARE

## 2025-07-24 DIAGNOSIS — F33.3 MAJOR DEPRESSIVE DISORDER, RECURRENT, SEVERE WITH PSYCHOTIC FEATURES (HCC): ICD-10-CM

## 2025-07-24 DIAGNOSIS — F41.1 GENERALIZED ANXIETY DISORDER: Primary | ICD-10-CM

## 2025-07-24 PROCEDURE — 90832 PSYTX W PT 30 MINUTES: CPT | Performed by: COUNSELOR

## 2025-07-24 NOTE — PSYCH
Virtual Regular Visit  Name: Edward Wong      : 1980      MRN: [id number]  Encounter Provider: ROSIE Peace  Encounter Date: 2025   Encounter department: [facility] [address]  :  Assessment & Plan  Generalized anxiety disorder         Major depressive disorder, recurrent, severe with psychotic features (HCC)             History of Present Illness     HPI  Review of Systems    Objective   There were no vitals taken for this visit.    Physical Exam    Administrative Statements   Encounter provider ROSIE Peace    The Patient is located at Home and in the following state in which I hold an active license [place].    The patient was identified by name and date of birth. Edward Wong was informed that this is a telemedicine visit and that the visit is being conducted through the Epic Embedded platform. He agrees to proceed..  My office door was closed. No one else was in the room.  He acknowledged consent and understanding of privacy and security of the video platform. The patient has agreed to participate and understands they can discontinue the visit at any time.    I have spent a total time of 25 minutes in caring for this patient on the day of the visit/encounter including Counseling / Coordination of care, not including the time spent for establishing the audio/video connection.    Behavioral Health Psychotherapy Progress Note    Psychotherapy Provided: Individual Psychotherapy     1. Generalized anxiety disorder        2. Major depressive disorder, recurrent, severe with psychotic features (HCC)            Goals addressed in session: Goal 1 and Goal 2     DATA:     WINDY conducted an individual therapy session to assess moods, behaviors and overall functioning. Started session with check in to explore nature of stressors, identify triggers and coping strategies to regulate moods and behaviors. Edward did apologize again for being late due technology  "issues. He appeared stressed out, identifying feeling states, noting his toilet damaged for throwing wipes he was not aware of. Expressed concerns and frustrations on how to lack of cleanness and messy areas on his apartment is affecting his mood.  Edward was observed on distress and not fully concentrating on this session due lot of environmental distractions.  Utilized cultural adapted CBT and DBT to explore stressors, identify areas of needs and assess maladaptive symptoms.   Therapist assisted practicing mindful breathing for symptoms deregulation. Edward was suggested implementing relaxation technique to distract from anxious thoughts and to regulate nervous system.  Edward then asked therapist for ending session earlier due someone coming home to fix toilet.       During this session, this clinician used the following therapeutic modalities: Engagement Strategies, Client-centered Therapy, Cognitive Behavioral Therapy, Cognitive Processing Therapy, Solution-Focused Therapy, and Supportive Psychotherapy    Substance Abuse was not addressed during this session. If the client is diagnosed with a co-occurring substance use disorder, please indicate any changes in the frequency or amount of use: . Stage of change for addressing substance use diagnoses: No substance use/Not applicable    ASSESSMENT:  Edward Wong presents with a Euthymic/ normal, Anxious, and Dysthymic mood.     his affect is Normal range and intensity, Blunted, and Constricted, which is congruent, with his mood and the content of the session. The client has not made progress on their goals.     Edward Wong presents with a none risk of suicide, none risk of self-harm, and none risk of harm to others.    For any risk assessment that surpasses a \"low\" rating, a safety plan must be developed.    A safety plan was indicated: no  If yes, describe in detail     PLAN: Between sessions, Edward Wong will work on " relaxation techniques to self regulate when stressed out. At the next session, the therapist will use Engagement Strategies, Client-centered Therapy, Cognitive Behavioral Therapy, Cognitive Processing Therapy, Solution-Focused Therapy, and Supportive Psychotherapy to address stressors.    Behavioral Health Treatment Plan and Discharge Planning: Edward Wong is aware of and agrees to continue to work on their treatment plan. They have identified and are working toward their discharge goals. yes    Depression Follow-up Plan Completed: No    Visit start and stop times:    07/24/25  Start Time: 0110  Stop Time: 0135  Total Visit Time: 25 minutes

## 2025-07-25 DIAGNOSIS — F31.4 BIPOLAR 1 DISORDER, DEPRESSED, SEVERE (HCC): ICD-10-CM

## 2025-07-28 RX ORDER — BENZTROPINE MESYLATE 2 MG/1
2 TABLET ORAL 2 TIMES DAILY
Qty: 60 TABLET | Refills: 5 | Status: SHIPPED | OUTPATIENT
Start: 2025-07-28

## 2025-07-29 ENCOUNTER — TELEPHONE (OUTPATIENT)
Dept: PSYCHIATRY | Facility: CLINIC | Age: 45
End: 2025-07-29

## 2025-08-05 ENCOUNTER — TELEPHONE (OUTPATIENT)
Dept: PSYCHIATRY | Facility: CLINIC | Age: 45
End: 2025-08-05

## 2025-08-08 ENCOUNTER — PATIENT OUTREACH (OUTPATIENT)
Dept: SURGERY | Facility: CLINIC | Age: 45
End: 2025-08-08

## 2025-08-15 ENCOUNTER — TELEMEDICINE (OUTPATIENT)
Dept: BEHAVIORAL/MENTAL HEALTH CLINIC | Facility: CLINIC | Age: 45
End: 2025-08-15

## 2025-08-19 ENCOUNTER — TELEPHONE (OUTPATIENT)
Dept: PSYCHIATRY | Facility: CLINIC | Age: 45
End: 2025-08-19

## 2025-08-20 ENCOUNTER — TELEPHONE (OUTPATIENT)
Age: 45
End: 2025-08-20

## 2025-08-20 ENCOUNTER — TELEMEDICINE (OUTPATIENT)
Dept: PSYCHIATRY | Facility: CLINIC | Age: 45
End: 2025-08-20
Payer: MEDICARE

## 2025-08-20 DIAGNOSIS — G47.00 INSOMNIA, UNSPECIFIED TYPE: ICD-10-CM

## 2025-08-20 DIAGNOSIS — F41.0 GENERALIZED ANXIETY DISORDER WITH PANIC ATTACKS: ICD-10-CM

## 2025-08-20 DIAGNOSIS — F43.10 PTSD (POST-TRAUMATIC STRESS DISORDER): ICD-10-CM

## 2025-08-20 DIAGNOSIS — F31.4 BIPOLAR 1 DISORDER, DEPRESSED, SEVERE (HCC): Primary | ICD-10-CM

## 2025-08-20 DIAGNOSIS — F41.1 GENERALIZED ANXIETY DISORDER WITH PANIC ATTACKS: ICD-10-CM

## 2025-08-20 PROCEDURE — 99214 OFFICE O/P EST MOD 30 MIN: CPT | Performed by: PSYCHIATRY & NEUROLOGY

## 2025-08-20 RX ORDER — BUPROPION HYDROCHLORIDE 200 MG/1
200 TABLET, EXTENDED RELEASE ORAL DAILY
Qty: 30 TABLET | Refills: 5 | Status: SHIPPED | OUTPATIENT
Start: 2025-08-20

## 2025-08-20 RX ORDER — RISPERIDONE 3 MG/1
3 TABLET ORAL
Qty: 30 TABLET | Refills: 5 | Status: SHIPPED | OUTPATIENT
Start: 2025-08-20

## 2025-08-20 RX ORDER — HYDROXYZINE PAMOATE 50 MG/1
150 CAPSULE ORAL
Qty: 90 CAPSULE | Refills: 5 | Status: SHIPPED | OUTPATIENT
Start: 2025-08-20

## 2025-08-20 RX ORDER — LORAZEPAM 0.5 MG/1
0.5 TABLET ORAL 2 TIMES DAILY
Qty: 60 TABLET | Refills: 5 | Status: SHIPPED | OUTPATIENT
Start: 2025-08-24

## 2025-08-20 RX ORDER — LORAZEPAM 2 MG/1
2 TABLET ORAL
Qty: 30 TABLET | Refills: 5 | Status: SHIPPED | OUTPATIENT
Start: 2025-08-24